# Patient Record
Sex: FEMALE | Race: WHITE | NOT HISPANIC OR LATINO | Employment: UNEMPLOYED | URBAN - NONMETROPOLITAN AREA
[De-identification: names, ages, dates, MRNs, and addresses within clinical notes are randomized per-mention and may not be internally consistent; named-entity substitution may affect disease eponyms.]

---

## 2017-08-29 ENCOUNTER — TRANSFERRED RECORDS (OUTPATIENT)
Dept: HEALTH INFORMATION MANAGEMENT | Facility: HOSPITAL | Age: 22
End: 2017-08-29

## 2018-08-22 ENCOUNTER — TRANSFERRED RECORDS (OUTPATIENT)
Dept: HEALTH INFORMATION MANAGEMENT | Facility: HOSPITAL | Age: 23
End: 2018-08-22

## 2018-09-12 ENCOUNTER — TRANSFERRED RECORDS (OUTPATIENT)
Dept: HEALTH INFORMATION MANAGEMENT | Facility: CLINIC | Age: 23
End: 2018-09-12

## 2018-12-19 ENCOUNTER — TRANSFERRED RECORDS (OUTPATIENT)
Dept: HEALTH INFORMATION MANAGEMENT | Facility: HOSPITAL | Age: 23
End: 2018-12-19

## 2019-03-04 ENCOUNTER — TRANSFERRED RECORDS (OUTPATIENT)
Dept: HEALTH INFORMATION MANAGEMENT | Facility: CLINIC | Age: 24
End: 2019-03-04

## 2019-03-07 ENCOUNTER — TRANSFERRED RECORDS (OUTPATIENT)
Dept: HEALTH INFORMATION MANAGEMENT | Facility: CLINIC | Age: 24
End: 2019-03-07

## 2019-05-02 ENCOUNTER — TRANSFERRED RECORDS (OUTPATIENT)
Dept: HEALTH INFORMATION MANAGEMENT | Facility: HOSPITAL | Age: 24
End: 2019-05-02

## 2019-07-24 ENCOUNTER — TRANSFERRED RECORDS (OUTPATIENT)
Dept: HEALTH INFORMATION MANAGEMENT | Facility: CLINIC | Age: 24
End: 2019-07-24

## 2023-04-18 PROBLEM — G43.909 MIGRAINES: Status: ACTIVE | Noted: 2019-07-09

## 2023-04-18 PROBLEM — K58.1 IRRITABLE BOWEL SYNDROME WITH CONSTIPATION: Status: ACTIVE | Noted: 2022-03-01

## 2023-04-18 PROBLEM — N80.9 ENDOMETRIOSIS: Status: ACTIVE | Noted: 2019-03-26

## 2023-04-18 PROBLEM — F32.A DEPRESSION: Status: ACTIVE | Noted: 2019-02-11

## 2023-04-18 PROBLEM — B00.2 ORAL HERPES: Status: ACTIVE | Noted: 2018-10-25

## 2023-05-03 NOTE — PROGRESS NOTES
Assessment & Plan     Encounter to establish care  Medical, surgical, family, and social histories discussed updated. Reviewed active healthcare problems. Medications reviewed. NATAN obtained for [facility]. Reviewed Care Everywhere [facility].     Intractable migraine with aura without status migrainosus  Stable, uses as needed ibuprofen.   Does have vision changes with the migraines - has aura.     Endometriosis  Menorrhagia with regular cycle  Encounter for initial prescription of contraceptive pills  Reports she saw OB GYN and was diagnosed at [facility] in [place]. Was started on progestin only with her migraines but somehow is now on estrogen containing birth control. Reviewed contraindication (increased stroke risk) with this today. Plan to change to Micronor for now. Previously tried Mirena, does not want depo shot. Is using for menses related pain and to avoid getting heavy periods and periods less often. Is s/p tubal ligation.   As she does get significantly painful menses, will have her see OB-GYN for other recommendations. She is currently using naproxen prior to menses starting.   - Ob/Gyn Referral  - norethindrone (MICRONOR) 0.35 MG tablet; Take 1 tablet (0.35 mg) by mouth daily    Irritable bowel syndrome with constipation  Long standing history - diagnosed as a child.   Has not been on medication or treated for 10 years.   Is overall stable.       Review of external notes as documented elsewhere in note       Scheduled for physical in one week.       Nirali Stephens MD  [facility] - SHABBIR Boateng is a 27 year old, presenting for the following health issues:  Establish Care    HPI   Establish care - prior care in [place]   Moved to [place], moms family lives up here  Has five kids.   Will do pap next week.   Does not like blood work - avoids needles.     Birth control - Has diagnosis of endometriosis. Gets significant pain with periods.  "Gets her period every 3 months with the birth control. Currently on estrogen birth control. Is s/p tubal ligation. Has tried Mirena - put in right put it shifted. Told her to not get another one. Will not do needles. Would see OB-GYN. Thinks she was on micronor initially when started by OB GYN in [facility].     Does get migraines with vision changes.   No history of DVT, or family history of clotting disorders. Is a non smoker. No history of elevated blood pressures or hypertension, breast cancer, or stroke.     For migraines, uses ibuprofen for relief.     Put on depression medication after child birth. Has been good since then.     IBS - constipation predominant. Diagnosed at [place] when she was a child. Not doing much recently. Hasn't really treated for ten years.          Objective    /84   Pulse 96   Temp 99.1  F (37.3  C) (Tympanic)   Resp 16   Ht 1.651 m (5' 5\")   Wt 81.2 kg (179 lb 1.6 oz)   LMP 02/27/2023 (Approximate)   SpO2 98%   BMI 29.80 kg/m    Body mass index is 29.8 kg/m .     Physical Exam   GENERAL: healthy, alert and no distress  RESP: lungs clear to auscultation - no rales, rhonchi or wheezes  CV: regular rate and rhythm, normal S1 S2, no S3 or S4, no murmur, click or rub, no peripheral edema and peripheral pulses strong  PSYCH: mentation appears normal, affect normal/bright                    Answers for HPI/ROS submitted by the patient on 5/4/2023  If you checked off any problems, how difficult have these problems made it for you to do your work, take care of things at home, or get along with other people?: Not difficult at all  PHQ9 TOTAL SCORE: 0  BALAJI 7 TOTAL SCORE: 0      "

## 2023-05-04 ASSESSMENT — ANXIETY QUESTIONNAIRES
2. NOT BEING ABLE TO STOP OR CONTROL WORRYING: NOT AT ALL
GAD7 TOTAL SCORE: 0
4. TROUBLE RELAXING: NOT AT ALL
GAD7 TOTAL SCORE: 0
7. FEELING AFRAID AS IF SOMETHING AWFUL MIGHT HAPPEN: NOT AT ALL
7. FEELING AFRAID AS IF SOMETHING AWFUL MIGHT HAPPEN: NOT AT ALL
6. BECOMING EASILY ANNOYED OR IRRITABLE: NOT AT ALL
8. IF YOU CHECKED OFF ANY PROBLEMS, HOW DIFFICULT HAVE THESE MADE IT FOR YOU TO DO YOUR WORK, TAKE CARE OF THINGS AT HOME, OR GET ALONG WITH OTHER PEOPLE?: NOT DIFFICULT AT ALL
5. BEING SO RESTLESS THAT IT IS HARD TO SIT STILL: NOT AT ALL
IF YOU CHECKED OFF ANY PROBLEMS ON THIS QUESTIONNAIRE, HOW DIFFICULT HAVE THESE PROBLEMS MADE IT FOR YOU TO DO YOUR WORK, TAKE CARE OF THINGS AT HOME, OR GET ALONG WITH OTHER PEOPLE: NOT DIFFICULT AT ALL
GAD7 TOTAL SCORE: 0
1. FEELING NERVOUS, ANXIOUS, OR ON EDGE: NOT AT ALL
3. WORRYING TOO MUCH ABOUT DIFFERENT THINGS: NOT AT ALL

## 2023-05-04 ASSESSMENT — PATIENT HEALTH QUESTIONNAIRE - PHQ9
10. IF YOU CHECKED OFF ANY PROBLEMS, HOW DIFFICULT HAVE THESE PROBLEMS MADE IT FOR YOU TO DO YOUR WORK, TAKE CARE OF THINGS AT HOME, OR GET ALONG WITH OTHER PEOPLE: NOT DIFFICULT AT ALL
SUM OF ALL RESPONSES TO PHQ QUESTIONS 1-9: 0
SUM OF ALL RESPONSES TO PHQ QUESTIONS 1-9: 0

## 2023-05-05 ENCOUNTER — OFFICE VISIT (OUTPATIENT)
Dept: FAMILY MEDICINE | Facility: OTHER | Age: 28
End: 2023-05-05
Attending: STUDENT IN AN ORGANIZED HEALTH CARE EDUCATION/TRAINING PROGRAM
Payer: COMMERCIAL

## 2023-05-05 VITALS
RESPIRATION RATE: 16 BRPM | OXYGEN SATURATION: 98 % | TEMPERATURE: 99.1 F | WEIGHT: 179.1 LBS | HEIGHT: 65 IN | SYSTOLIC BLOOD PRESSURE: 120 MMHG | DIASTOLIC BLOOD PRESSURE: 84 MMHG | BODY MASS INDEX: 29.84 KG/M2 | HEART RATE: 96 BPM

## 2023-05-05 DIAGNOSIS — Z76.89 ENCOUNTER TO ESTABLISH CARE: Primary | ICD-10-CM

## 2023-05-05 DIAGNOSIS — N92.0 MENORRHAGIA WITH REGULAR CYCLE: ICD-10-CM

## 2023-05-05 DIAGNOSIS — N80.9 ENDOMETRIOSIS: Chronic | ICD-10-CM

## 2023-05-05 DIAGNOSIS — Z30.011 ENCOUNTER FOR INITIAL PRESCRIPTION OF CONTRACEPTIVE PILLS: ICD-10-CM

## 2023-05-05 DIAGNOSIS — K58.1 IRRITABLE BOWEL SYNDROME WITH CONSTIPATION: Chronic | ICD-10-CM

## 2023-05-05 DIAGNOSIS — G43.119 INTRACTABLE MIGRAINE WITH AURA WITHOUT STATUS MIGRAINOSUS: ICD-10-CM

## 2023-05-05 PROBLEM — G43.019 INTRACTABLE MIGRAINE WITHOUT AURA AND WITHOUT STATUS MIGRAINOSUS: Status: ACTIVE | Noted: 2019-07-09

## 2023-05-05 PROBLEM — F32.A DEPRESSION: Chronic | Status: ACTIVE | Noted: 2019-02-11

## 2023-05-05 PROBLEM — G43.019 INTRACTABLE MIGRAINE WITHOUT AURA AND WITHOUT STATUS MIGRAINOSUS: Chronic | Status: ACTIVE | Noted: 2019-07-09

## 2023-05-05 PROCEDURE — G0463 HOSPITAL OUTPT CLINIC VISIT: HCPCS

## 2023-05-05 PROCEDURE — 99204 OFFICE O/P NEW MOD 45 MIN: CPT | Performed by: STUDENT IN AN ORGANIZED HEALTH CARE EDUCATION/TRAINING PROGRAM

## 2023-05-05 RX ORDER — NORETHINDRONE ACETATE AND ETHINYL ESTRADIOL 1.5-30(21)
1 KIT ORAL
Status: CANCELLED | OUTPATIENT
Start: 2023-05-05

## 2023-05-05 RX ORDER — ACETAMINOPHEN AND CODEINE PHOSPHATE 120; 12 MG/5ML; MG/5ML
0.35 SOLUTION ORAL DAILY
Qty: 84 TABLET | Refills: 0 | Status: SHIPPED | OUTPATIENT
Start: 2023-05-05 | End: 2023-07-17

## 2023-05-05 ASSESSMENT — PAIN SCALES - GENERAL: PAINLEVEL: NO PAIN (0)

## 2023-05-07 ASSESSMENT — ENCOUNTER SYMPTOMS
ARTHRALGIAS: 0
PALPITATIONS: 0
DIARRHEA: 0
HEMATURIA: 0
HEARTBURN: 1
WEAKNESS: 0
MYALGIAS: 0
FREQUENCY: 0
SORE THROAT: 0
NERVOUS/ANXIOUS: 0
DIZZINESS: 0
DYSURIA: 0
PARESTHESIAS: 0
BREAST MASS: 0
JOINT SWELLING: 0
CHILLS: 0
NAUSEA: 0
FEVER: 0
CONSTIPATION: 1
HEMATOCHEZIA: 0
HEADACHES: 0
EYE PAIN: 0
COUGH: 0
SHORTNESS OF BREATH: 0
ABDOMINAL PAIN: 0

## 2023-05-09 NOTE — PATIENT INSTRUCTIONS
Call to schedule eye exam  Try [facility] dental services [place]- [phone]  Try magnesium gummy for constipation  Make sure you're hydrating   Can try famotidine or pepcid as needed for heartburn   TMJ - need dental appointment; consider mouth guard; physical therapy will call; try topical voltaren gel; exercises on youtube.     Preventive Health Recommendations  Female Ages 26 - 39  Yearly exam:   See your health care provider every year in order to  Review health changes.   Discuss preventive care.    Review your medicines if you your doctor has prescribed any.    Until age 30: Get a Pap test every three years (more often if you have had an abnormal result).    After age 30: Talk to your doctor about whether you should have a Pap test every 3 years or have a Pap test with HPV screening every 5 years.   You do not need a Pap test if your uterus was removed (hysterectomy) and you have not had cancer.  You should be tested each year for STDs (sexually transmitted diseases), if you're at risk.   Talk to your provider about how often to have your cholesterol checked.  If you are at risk for diabetes, you should have a diabetes test (fasting glucose).  Shots: Get a flu shot each year. Get a tetanus shot every 10 years.   Nutrition:   Eat at least 5 servings of fruits and vegetables each day.  Eat whole-grain bread, whole-wheat pasta and brown rice instead of white grains and rice.  Get adequate Calcium and Vitamin D.     Lifestyle  Exercise at least 150 minutes a week (30 minutes a day, 5 days of the week). This will help you control your weight and prevent disease.  Limit alcohol to one drink per day.  No smoking.   Wear sunscreen to prevent skin cancer.  See your dentist every six months for an exam and cleaning.

## 2023-05-12 ENCOUNTER — OFFICE VISIT (OUTPATIENT)
Dept: FAMILY MEDICINE | Facility: OTHER | Age: 28
End: 2023-05-12
Attending: STUDENT IN AN ORGANIZED HEALTH CARE EDUCATION/TRAINING PROGRAM
Payer: COMMERCIAL

## 2023-05-12 VITALS
TEMPERATURE: 98.5 F | HEART RATE: 88 BPM | BODY MASS INDEX: 29.42 KG/M2 | WEIGHT: 176.6 LBS | DIASTOLIC BLOOD PRESSURE: 68 MMHG | OXYGEN SATURATION: 98 % | SYSTOLIC BLOOD PRESSURE: 101 MMHG | HEIGHT: 65 IN

## 2023-05-12 DIAGNOSIS — Z00.00 ROUTINE GENERAL MEDICAL EXAMINATION AT A HEALTH CARE FACILITY: Primary | ICD-10-CM

## 2023-05-12 DIAGNOSIS — Z12.4 PAP SMEAR FOR CERVICAL CANCER SCREENING: ICD-10-CM

## 2023-05-12 DIAGNOSIS — K59.00 CONSTIPATION, UNSPECIFIED CONSTIPATION TYPE: ICD-10-CM

## 2023-05-12 DIAGNOSIS — M26.609 TMJ (TEMPOROMANDIBULAR JOINT SYNDROME): ICD-10-CM

## 2023-05-12 PROCEDURE — G0463 HOSPITAL OUTPT CLINIC VISIT: HCPCS

## 2023-05-12 PROCEDURE — 99395 PREV VISIT EST AGE 18-39: CPT | Performed by: STUDENT IN AN ORGANIZED HEALTH CARE EDUCATION/TRAINING PROGRAM

## 2023-05-12 PROCEDURE — G0123 SCREEN CERV/VAG THIN LAYER: HCPCS | Mod: ZL | Performed by: STUDENT IN AN ORGANIZED HEALTH CARE EDUCATION/TRAINING PROGRAM

## 2023-05-12 ASSESSMENT — ENCOUNTER SYMPTOMS
HEARTBURN: 1
DYSURIA: 0
SORE THROAT: 0
PALPITATIONS: 0
FEVER: 0
HEADACHES: 0
FREQUENCY: 0
MYALGIAS: 0
HEMATURIA: 0
JOINT SWELLING: 0
CHILLS: 0
COUGH: 0
ARTHRALGIAS: 0
NAUSEA: 0
ABDOMINAL PAIN: 0
EYE PAIN: 0
BREAST MASS: 0
WEAKNESS: 0
HEMATOCHEZIA: 0
CONSTIPATION: 1
NERVOUS/ANXIOUS: 0
SHORTNESS OF BREATH: 0
DIZZINESS: 0
DIARRHEA: 0
PARESTHESIAS: 0

## 2023-05-12 ASSESSMENT — PATIENT HEALTH QUESTIONNAIRE - PHQ9
SUM OF ALL RESPONSES TO PHQ QUESTIONS 1-9: 0
10. IF YOU CHECKED OFF ANY PROBLEMS, HOW DIFFICULT HAVE THESE PROBLEMS MADE IT FOR YOU TO DO YOUR WORK, TAKE CARE OF THINGS AT HOME, OR GET ALONG WITH OTHER PEOPLE: NOT DIFFICULT AT ALL
SUM OF ALL RESPONSES TO PHQ QUESTIONS 1-9: 0

## 2023-05-12 ASSESSMENT — PAIN SCALES - GENERAL: PAINLEVEL: NO PAIN (0)

## 2023-05-12 NOTE — PROGRESS NOTES
SUBJECTIVE:   CC: Tasneem is an 27 year old who presents for preventive health visit.   Patient has been advised of split billing requirements and indicates understanding: Yes  Healthy Habits:     Getting at least 3 servings of Calcium per day:  Yes    Bi-annual eye exam:  NO    Dental care twice a year:  NO    Sleep apnea or symptoms of sleep apnea:  None    Diet:  Regular (no restrictions)    Frequency of exercise:  4-5 days/week    Duration of exercise:  Other    Taking medications regularly:  Yes    Medication side effects:  Other    PHQ-2 Total Score: 0    Additional concerns today:  No    Has been told she needs glasses in the past. Last eye exam 2-3 years ago.     Constipation - can go 2 weeks w/o BM. Has been taking plexus. Going more - once/day, every other day     Heartburn - comes/goes. Occasional. Not daily. tums doesn't help.     TMJ - has noticed pain in left jaw with clicking and occasional pain with eating for >1 year. Due for dental exam. No headaches. No tooth infections. Hasn't tried any treatment.     Answers for HPI/ROS submitted by the patient on 5/12/2023  If you checked off any problems, how difficult have these problems made it for you to do your work, take care of things at home, or get along with other people?: Not difficult at all  PHQ9 TOTAL SCORE: 0      Today's PHQ-2 Score:       5/7/2023    10:53 AM   PHQ-2 ( 1999 [employer])   Q1: Little interest or pleasure in doing things 0   Q2: Feeling down, depressed or hopeless 0   PHQ-2 Score 0   Q1: Little interest or pleasure in doing things Not at all   Q2: Feeling down, depressed or hopeless Not at all   PHQ-2 Score 0       Have you ever done Advance Care Planning? (For example, a Health Directive, POLST, or a discussion with a medical provider or your loved ones about your wishes): No, advance care planning information given to patient to review.  Patient declined advance care planning discussion at this time.    Social History     Tobacco  Use     Smoking status: Never     Smokeless tobacco: Never   Vaping Use     Vaping status: Never Used   Substance Use Topics     Alcohol use: Yes     Comment: rare           2023    10:53 AM   Alcohol Use   Prescreen: >3 drinks/day or >7 drinks/week? No     Reviewed orders with patient.  Reviewed health maintenance and updated orders accordingly - Yes    Breast Cancer Screenin/7/2023    10:55 AM   Breast CA Risk Assessment (FHS-7)   Do you have a family history of breast, colon, or ovarian cancer? No / Unknown         Patient under 40 years of age: Routine Mammogram Screening not recommended.   Pertinent mammograms are reviewed under the imaging tab.    History of abnormal Pap smear: NO - age 21-29 PAP every 3 years    recommended- unknown if abnormal was told there was something they wanted to watch when she had last one about 11 years ago but doesnt remember what it was         Reviewed and updated as needed this visit by clinical staff   Tobacco  Allergies  Meds  Problems  Med Hx  Surg Hx  Fam Hx          Reviewed and updated as needed this visit by Provider   Tobacco  Allergies  Meds  Problems  Med Hx  Surg Hx  Fam Hx           Review of Systems   Constitutional: Negative for chills and fever.   HENT: Negative for congestion, ear pain, hearing loss and sore throat.    Eyes: Negative for pain and visual disturbance.   Respiratory: Negative for cough and shortness of breath.    Cardiovascular: Negative for chest pain, palpitations and peripheral edema.   Gastrointestinal: Positive for constipation and heartburn. Negative for abdominal pain, diarrhea, hematochezia and nausea.   Breasts:  Negative for tenderness, breast mass and discharge.   Genitourinary: Negative for dysuria, frequency, genital sores, hematuria, pelvic pain, urgency, vaginal bleeding and vaginal discharge.   Musculoskeletal: Negative for arthralgias, joint swelling and myalgias.   Skin: Negative for rash.  "  Neurological: Negative for dizziness, weakness, headaches and paresthesias.   Psychiatric/Behavioral: Negative for mood changes. The patient is not nervous/anxious.         OBJECTIVE:   /68 (BP Location: Left arm, Patient Position: Sitting, Cuff Size: Adult Large)   Pulse 88   Temp 98.5  F (36.9  C) (Tympanic)   Ht 1.651 m (5' 5\")   Wt 80.1 kg (176 lb 9.6 oz)   LMP 02/27/2023 (Approximate)   SpO2 98%   BMI 29.39 kg/m       Physical Exam  Constitutional:       General: She is not in acute distress.     Appearance: Normal appearance. She is well-developed. She is not ill-appearing.   HENT:      Head: Normocephalic and atraumatic.      Comments: Tender of left TMJ, clicking appreciated bilaterally     Right Ear: Tympanic membrane and external ear normal.      Left Ear: Tympanic membrane and external ear normal.      Nose: Nose normal.      Mouth/Throat:      Mouth: Mucous membranes are moist.      Pharynx: No oropharyngeal exudate.   Eyes:      Extraocular Movements: Extraocular movements intact.      Conjunctiva/sclera: Conjunctivae normal.   Neck:      Thyroid: No thyroid mass, thyromegaly or thyroid tenderness.   Cardiovascular:      Rate and Rhythm: Normal rate and regular rhythm.      Pulses: Normal pulses.      Heart sounds: Normal heart sounds, S1 normal and S2 normal. No murmur heard.  Pulmonary:      Effort: Pulmonary effort is normal. No respiratory distress.      Breath sounds: Normal breath sounds. No wheezing, rhonchi or rales.   Chest:   Breasts:     Right: No inverted nipple or mass.      Left: No inverted nipple or mass.   Abdominal:      General: Bowel sounds are normal. There is no abdominal bruit.      Palpations: Abdomen is soft. There is no mass or pulsatile mass.      Tenderness: There is no abdominal tenderness.   Genitourinary:     General: Normal vulva.      Vagina: Normal.      Cervix: Friability present. No lesion or cervical bleeding.   Musculoskeletal:         General: Normal " "range of motion.      Cervical back: Neck supple.      Right lower leg: No edema.      Left lower leg: No edema.   Lymphadenopathy:      Cervical: No cervical adenopathy.   Skin:     General: Skin is warm and dry.      Capillary Refill: Capillary refill takes less than 2 seconds.      Findings: No rash.   Neurological:      Mental Status: She is alert and oriented to person, place, and time.      Gait: Gait is intact.   Psychiatric:         Attention and Perception: Attention normal.         Mood and Affect: Mood normal.         Speech: Speech normal.         Behavior: Behavior normal.         Thought Content: Thought content normal.         Cognition and Memory: Cognition normal.         Judgment: Judgment normal.         ASSESSMENT/PLAN:   Routine general medical examination at a health care facility  Pap updated today.   No need for early mammogram or colon cancer screening based on family history.   Declined labs today.     Pap smear for cervical cancer screening  Thinks prior was normal but she is not certain.   - A pap thin layer screen reflex to HPV if ASCUS - recommend age 25 - 29    TMJ (temporomandibular joint syndrome)  Recommend dental evaluation and mouthguard.  Discussed option for topical Voltaren gel to help with discomfort and to begin doing exercises.  Physical therapy also ordered.  Follow-up to reassess in 6 weeks.  - diclofenac (VOLTAREN) 1 % topical gel; Apply 2 g topically 3 times daily  - Physical Therapy Referral; Future    Constipation  Long history of constipation.   Currently managed with supplement.   Discussed option to add magnesium Gummies  Follow up if worsening         COUNSELING:  Reviewed preventive health counseling, as reflected in patient instructions       Regular exercise       Healthy diet/nutrition       Vision screening      BMI:   Estimated body mass index is 29.39 kg/m  as calculated from the following:    Height as of this encounter: 1.651 m (5' 5\").    Weight as of " this encounter: 80.1 kg (176 lb 9.6 oz).   Weight management plan: Discussed healthy diet and exercise guidelines      She reports that she has never smoked. She has never used smokeless tobacco.      Nirali Stephens MD  [facility] [place]

## 2023-05-16 ENCOUNTER — OFFICE VISIT (OUTPATIENT)
Dept: OBGYN | Facility: OTHER | Age: 28
End: 2023-05-16
Attending: OBSTETRICS & GYNECOLOGY
Payer: COMMERCIAL

## 2023-05-16 VITALS
SYSTOLIC BLOOD PRESSURE: 100 MMHG | WEIGHT: 173.3 LBS | OXYGEN SATURATION: 98 % | BODY MASS INDEX: 28.84 KG/M2 | DIASTOLIC BLOOD PRESSURE: 70 MMHG | HEART RATE: 86 BPM

## 2023-05-16 DIAGNOSIS — N80.9 ENDOMETRIOSIS: ICD-10-CM

## 2023-05-16 DIAGNOSIS — N94.19 DYSPAREUNIA DUE TO MEDICAL CONDITION IN FEMALE: ICD-10-CM

## 2023-05-16 DIAGNOSIS — N94.6 DYSMENORRHEA: ICD-10-CM

## 2023-05-16 DIAGNOSIS — N92.0 MENORRHAGIA WITH REGULAR CYCLE: Primary | ICD-10-CM

## 2023-05-16 PROCEDURE — 99203 OFFICE O/P NEW LOW 30 MIN: CPT | Performed by: OBSTETRICS & GYNECOLOGY

## 2023-05-16 PROCEDURE — G0463 HOSPITAL OUTPT CLINIC VISIT: HCPCS

## 2023-05-16 PROCEDURE — G0463 HOSPITAL OUTPT CLINIC VISIT: HCPCS | Mod: 25

## 2023-05-16 ASSESSMENT — PAIN SCALES - GENERAL: PAINLEVEL: NO PAIN (0)

## 2023-05-16 NOTE — PROGRESS NOTES
CC:  Consult from Dr. Stephens for menorrhagia/dysmenorrhea/endometriosis  HPI:  Tasneem Harrington is a 27 year old female P5 (vag).  She has a h/o heavy menses and was put on continuous/every three month OCP regimen. Menses on that were Regular lasting 7 days with 4 of heavy flow.  Associated severe dysmenorrhea starting 1 day prior to menses.  She has a h/o migraines with auro and was advised against estrogen containing OCP's so was just started on Norethindrone/Naparoxen.  She was diagnosed with endometriosis in 2019 at time of laparascopic salpingectomy.  Denies IMB or pelvic pain at other points in her cycle.  + dyspareunia/pain with BM.  Her menstrual flow is limiting her clothing choices and interferes with lifestyle/activites.     Outside records/Op reports  reviewed.    Clots: Yes  Intermenstrual bleeding: No  Post-coital bleeding: No  Previous work-up:Yes  Contraception: Salpingectomy  Abnormal Pap: No      Past GYN history: Neg STD history       Last PAP smear:  Previously nml, Pap pending.     Patients records are available and reviewed at today's visit.    Past Medical History:   Diagnosis Date     Endometriosis      Postpartum depression 02/11/2019       Past Surgical History:   Procedure Laterality Date     TONSILLECTOMY       TUBAL LIGATION         Family History   Problem Relation Age of Onset     Hypertension Father      Hyperlipidemia Father      Crohn's Disease Brother      Heart Disease Maternal Grandmother         leaky valve     Aneurysm Maternal Grandmother      Crohn's Disease Cousin      Cancer Paternal Uncle         throat cancer     Hyperlipidemia Son        Current Outpatient Medications   Medication Sig Dispense Refill     diclofenac (VOLTAREN) 1 % topical gel Apply 2 g topically 3 times daily 100 g 1     naproxen (NAPROSYN) 500 MG tablet Take 500 mg by mouth       norethindrone (MICRONOR) 0.35 MG tablet Take 1 tablet (0.35 mg) by mouth daily 84 tablet 0       Allergies: Latex, Morphine,  Sulfamethoxazole-trimethoprim, and Codeine    ROS:  CONSTITUTIONAL: NEGATIVE for fever, chills, change in weight    GI: NEGATIVE for nausea, abdominal pain, heartburn, or change in bowel habits  : NEGATIVE for frequency, dysuria, hematuria, vaginal discharge    EXAM:  Blood pressure 100/70, pulse 86, weight 78.6 kg (173 lb 4.8 oz), last menstrual period 02/27/2023, SpO2 98 %.   BMI= Body mass index is 28.84 kg/m .  General - pleasant female in no acute distress.  Rectovaginal - deferred, not repeated  Musculoskeletal - no gross deformities or edema  Neurological - normal mental status.      ASSESSMENT/PLAN:  Menorrhagia/dysmenorrhea/dyspareunia/endometriosis.    Discussed medical options(progesterone, GNRH agonists), surgical options including laparoscopy, hysterectomy.  R/B/SE of each.   At this point she would like to se how the progesterone (norethindrone) that she just started works prior to proceeding to any more aggressive treatments.  She is aware of SE's of irregular bleeding.    Pt has my card and phone number to call as needed if problems in the interim or for f/up if problems. 35  minutes were spent on the day of the encounter, outside of a procedure,  doing face-to-face counseling, review or records, charting,  and coordination of care

## 2023-05-18 LAB
BKR LAB AP GYN ADEQUACY: NORMAL
BKR LAB AP GYN INTERPRETATION: NORMAL
BKR LAB AP HPV REFLEX: NORMAL
BKR LAB AP PREVIOUS ABNORMAL: NORMAL
PATH REPORT.COMMENTS IMP SPEC: NORMAL
PATH REPORT.COMMENTS IMP SPEC: NORMAL
PATH REPORT.RELEVANT HX SPEC: NORMAL

## 2023-05-21 ENCOUNTER — HEALTH MAINTENANCE LETTER (OUTPATIENT)
Age: 28
End: 2023-05-21

## 2023-06-05 PROBLEM — G43.909 MIGRAINES: Status: ACTIVE | Noted: 2019-07-09

## 2023-06-16 PROBLEM — F32.A DEPRESSION: Chronic | Status: ACTIVE | Noted: 2023-06-16

## 2023-06-16 PROBLEM — F32.A DEPRESSION: Status: ACTIVE | Noted: 2023-06-16

## 2023-07-14 NOTE — PROGRESS NOTES
Assessment & Plan     TMJ (temporomandibular joint syndrome)  Doing well - symptoms stable.   Can continue with voltaren gel.   Encouraged her to see a dentist for a mouth guard.     Intractable migraine with aura without status migrainosus  Refilled. Rare use - 2x/month.   - ibuprofen (ADVIL/MOTRIN) 800 MG tablet; Take 1 tablet (800 mg) by mouth every 8 hours as needed for moderate pain or inflammatory pain (migraines) Do not take with naproxen.    Menorrhagia with regular cycle  Refilled. Naproxen only used with menses. micronor refilled.   - naproxen (NAPROSYN) 500 MG tablet; Take 1 tablet (500 mg) by mouth 2 times daily as needed for moderate pain (menses related pain)  - norethindrone (MICRONOR) 0.35 MG tablet; Take 1 tablet (0.35 mg) by mouth daily    Encounter for surveillance of contraceptive pills  Refilled.   - norethindrone (MICRONOR) 0.35 MG tablet; Take 1 tablet (0.35 mg) by mouth daily    Return in about 43 weeks (around 5/13/2024) for Physical.    Nirali Stephens MD  [facility] - SHABBIR Boateng is a 28 year old, presenting for the following health issues:  TMJ      HPI   Concern - TMJ  Onset: over a year    Description:   jaw pain in both sides with opening mouth     Intensity: moderate    Progression of Symptoms:  intermittent    Accompanying Signs & Symptoms:  Headaches, stiff neck     Previous history of similar problem:   No- ongoing for years    Precipitating factors:   Worsened by: eating, talking    Alleviating factors:  Improved by: nothing  Therapies Tried and outcome: voltaren    voltaren has been helpful - doesn't seem to flare as often.   Rare, weeks in between flares.   Does voltaren once/day.     Needing refill of ibuprofen for migraines. Use is 2/30 days usually.   Uses naproxen for menses. Only takes on days of periods.   Considering partial hysterectomy. Would like to wait until winter.         Objective    /68   Pulse 92   Resp 16   Wt 79.9  kg (176 lb 4 oz)   SpO2 98%   BMI 29.33 kg/m    Body mass index is 29.33 kg/m .     Physical Exam  Constitutional:       General: She is not in acute distress.     Appearance: Normal appearance. She is not ill-appearing.   Neurological:      Mental Status: She is alert.   Psychiatric:         Mood and Affect: Mood normal.         Behavior: Behavior normal.         Thought Content: Thought content normal.         Judgment: Judgment normal.          Answers for HPI/ROS submitted by the patient on 7/16/2023  If you checked off any problems, how difficult have these problems made it for you to do your work, take care of things at home, or get along with other people?: Not difficult at all  PHQ9 TOTAL SCORE: 0

## 2023-07-16 ASSESSMENT — PATIENT HEALTH QUESTIONNAIRE - PHQ9
SUM OF ALL RESPONSES TO PHQ QUESTIONS 1-9: 0
SUM OF ALL RESPONSES TO PHQ QUESTIONS 1-9: 0
10. IF YOU CHECKED OFF ANY PROBLEMS, HOW DIFFICULT HAVE THESE PROBLEMS MADE IT FOR YOU TO DO YOUR WORK, TAKE CARE OF THINGS AT HOME, OR GET ALONG WITH OTHER PEOPLE: NOT DIFFICULT AT ALL

## 2023-07-17 ENCOUNTER — OFFICE VISIT (OUTPATIENT)
Dept: FAMILY MEDICINE | Facility: OTHER | Age: 28
End: 2023-07-17
Attending: STUDENT IN AN ORGANIZED HEALTH CARE EDUCATION/TRAINING PROGRAM
Payer: COMMERCIAL

## 2023-07-17 VITALS
HEART RATE: 92 BPM | SYSTOLIC BLOOD PRESSURE: 106 MMHG | OXYGEN SATURATION: 98 % | BODY MASS INDEX: 29.33 KG/M2 | DIASTOLIC BLOOD PRESSURE: 68 MMHG | RESPIRATION RATE: 16 BRPM | WEIGHT: 176.25 LBS

## 2023-07-17 DIAGNOSIS — N92.0 MENORRHAGIA WITH REGULAR CYCLE: ICD-10-CM

## 2023-07-17 DIAGNOSIS — Z30.41 ENCOUNTER FOR SURVEILLANCE OF CONTRACEPTIVE PILLS: ICD-10-CM

## 2023-07-17 DIAGNOSIS — M26.609 TMJ (TEMPOROMANDIBULAR JOINT SYNDROME): Primary | ICD-10-CM

## 2023-07-17 DIAGNOSIS — G43.119 INTRACTABLE MIGRAINE WITH AURA WITHOUT STATUS MIGRAINOSUS: ICD-10-CM

## 2023-07-17 PROCEDURE — G0463 HOSPITAL OUTPT CLINIC VISIT: HCPCS

## 2023-07-17 PROCEDURE — 99213 OFFICE O/P EST LOW 20 MIN: CPT | Performed by: STUDENT IN AN ORGANIZED HEALTH CARE EDUCATION/TRAINING PROGRAM

## 2023-07-17 RX ORDER — ACETAMINOPHEN AND CODEINE PHOSPHATE 120; 12 MG/5ML; MG/5ML
0.35 SOLUTION ORAL DAILY
Qty: 84 TABLET | Refills: 3 | Status: SHIPPED | OUTPATIENT
Start: 2023-07-17 | End: 2023-09-05

## 2023-07-17 RX ORDER — NAPROXEN 500 MG/1
500 TABLET ORAL 2 TIMES DAILY PRN
Qty: 30 TABLET | Refills: 3 | Status: SHIPPED | OUTPATIENT
Start: 2023-07-17 | End: 2023-10-19

## 2023-07-17 RX ORDER — IBUPROFEN 800 MG/1
800 TABLET, FILM COATED ORAL EVERY 8 HOURS PRN
Qty: 90 TABLET | Refills: 1 | Status: SHIPPED | OUTPATIENT
Start: 2023-07-17

## 2023-08-01 ENCOUNTER — PREP FOR PROCEDURE (OUTPATIENT)
Dept: OBGYN | Facility: OTHER | Age: 28
End: 2023-08-01

## 2023-08-01 DIAGNOSIS — N94.6 DYSMENORRHEA: ICD-10-CM

## 2023-08-01 DIAGNOSIS — N80.9 ENDOMETRIOSIS: Primary | ICD-10-CM

## 2023-09-05 ENCOUNTER — OFFICE VISIT (OUTPATIENT)
Dept: FAMILY MEDICINE | Facility: OTHER | Age: 28
End: 2023-09-05
Attending: STUDENT IN AN ORGANIZED HEALTH CARE EDUCATION/TRAINING PROGRAM
Payer: COMMERCIAL

## 2023-09-05 VITALS
DIASTOLIC BLOOD PRESSURE: 72 MMHG | WEIGHT: 173.6 LBS | BODY MASS INDEX: 28.92 KG/M2 | OXYGEN SATURATION: 99 % | SYSTOLIC BLOOD PRESSURE: 110 MMHG | HEIGHT: 65 IN | HEART RATE: 97 BPM | TEMPERATURE: 98 F

## 2023-09-05 DIAGNOSIS — Z01.818 PREOP GENERAL PHYSICAL EXAM: Primary | ICD-10-CM

## 2023-09-05 DIAGNOSIS — G43.119 INTRACTABLE MIGRAINE WITH AURA WITHOUT STATUS MIGRAINOSUS: Chronic | ICD-10-CM

## 2023-09-05 DIAGNOSIS — K58.1 IRRITABLE BOWEL SYNDROME WITH CONSTIPATION: Chronic | ICD-10-CM

## 2023-09-05 DIAGNOSIS — N92.0 MENORRHAGIA WITH REGULAR CYCLE: ICD-10-CM

## 2023-09-05 DIAGNOSIS — N80.9 ENDOMETRIOSIS: Chronic | ICD-10-CM

## 2023-09-05 LAB
ANION GAP SERPL CALCULATED.3IONS-SCNC: 12 MMOL/L (ref 7–15)
BASOPHILS # BLD AUTO: 0 10E3/UL (ref 0–0.2)
BASOPHILS NFR BLD AUTO: 1 %
BUN SERPL-MCNC: 15.2 MG/DL (ref 6–20)
CALCIUM SERPL-MCNC: 9.9 MG/DL (ref 8.6–10)
CHLORIDE SERPL-SCNC: 104 MMOL/L (ref 98–107)
CREAT SERPL-MCNC: 0.69 MG/DL (ref 0.51–0.95)
DEPRECATED HCO3 PLAS-SCNC: 24 MMOL/L (ref 22–29)
EOSINOPHIL # BLD AUTO: 0.1 10E3/UL (ref 0–0.7)
EOSINOPHIL NFR BLD AUTO: 1 %
ERYTHROCYTE [DISTWIDTH] IN BLOOD BY AUTOMATED COUNT: 13.7 % (ref 10–15)
GFR SERPL CREATININE-BSD FRML MDRD: >90 ML/MIN/1.73M2
GLUCOSE SERPL-MCNC: 93 MG/DL (ref 70–99)
HCT VFR BLD AUTO: 39.6 % (ref 35–47)
HGB BLD-MCNC: 13 G/DL (ref 11.7–15.7)
IMM GRANULOCYTES # BLD: 0 10E3/UL
IMM GRANULOCYTES NFR BLD: 0 %
LYMPHOCYTES # BLD AUTO: 2.7 10E3/UL (ref 0.8–5.3)
LYMPHOCYTES NFR BLD AUTO: 34 %
MCH RBC QN AUTO: 27.8 PG (ref 26.5–33)
MCHC RBC AUTO-ENTMCNC: 32.8 G/DL (ref 31.5–36.5)
MCV RBC AUTO: 85 FL (ref 78–100)
MONOCYTES # BLD AUTO: 0.6 10E3/UL (ref 0–1.3)
MONOCYTES NFR BLD AUTO: 8 %
NEUTROPHILS # BLD AUTO: 4.5 10E3/UL (ref 1.6–8.3)
NEUTROPHILS NFR BLD AUTO: 56 %
NRBC # BLD AUTO: 0 10E3/UL
NRBC BLD AUTO-RTO: 0 /100
PLATELET # BLD AUTO: 276 10E3/UL (ref 150–450)
POTASSIUM SERPL-SCNC: 3.8 MMOL/L (ref 3.4–5.3)
RBC # BLD AUTO: 4.68 10E6/UL (ref 3.8–5.2)
SODIUM SERPL-SCNC: 140 MMOL/L (ref 136–145)
WBC # BLD AUTO: 7.9 10E3/UL (ref 4–11)

## 2023-09-05 PROCEDURE — 36415 COLL VENOUS BLD VENIPUNCTURE: CPT | Mod: ZL | Performed by: STUDENT IN AN ORGANIZED HEALTH CARE EDUCATION/TRAINING PROGRAM

## 2023-09-05 PROCEDURE — G0463 HOSPITAL OUTPT CLINIC VISIT: HCPCS

## 2023-09-05 PROCEDURE — 85025 COMPLETE CBC W/AUTO DIFF WBC: CPT | Mod: ZL | Performed by: STUDENT IN AN ORGANIZED HEALTH CARE EDUCATION/TRAINING PROGRAM

## 2023-09-05 PROCEDURE — 93010 ELECTROCARDIOGRAM REPORT: CPT | Mod: 77 | Performed by: INTERNAL MEDICINE

## 2023-09-05 PROCEDURE — 80048 BASIC METABOLIC PNL TOTAL CA: CPT | Mod: ZL | Performed by: STUDENT IN AN ORGANIZED HEALTH CARE EDUCATION/TRAINING PROGRAM

## 2023-09-05 PROCEDURE — 93005 ELECTROCARDIOGRAM TRACING: CPT | Performed by: STUDENT IN AN ORGANIZED HEALTH CARE EDUCATION/TRAINING PROGRAM

## 2023-09-05 PROCEDURE — 99214 OFFICE O/P EST MOD 30 MIN: CPT | Performed by: STUDENT IN AN ORGANIZED HEALTH CARE EDUCATION/TRAINING PROGRAM

## 2023-09-05 ASSESSMENT — PAIN SCALES - GENERAL: PAINLEVEL: NO PAIN (0)

## 2023-09-05 NOTE — PROGRESS NOTES
[facility] [place]  [address]  [place] [address]  Phone: [phone]  Primary Provider: Myrna Shelton  Pre-op Performing Provider: MYRNA SHELTON      PREOPERATIVE EVALUATION:  Today's date: 9/5/2023    Tasneem Harrington is a 28 year old female who presents for a preoperative evaluation.      9/5/2023     2:58 PM   Additional Questions   Roomed by Apurva Mcintosh CMA   Accompanied by self       Surgical Information:  Surgery/Procedure: Partial Hysterectomy  Surgery Location: [place]  Surgeon: Jordin  Surgery Date: 09/20/2023  Time of Surgery: TBD  Where patient plans to recover: At home with family  Fax number for surgical facility: Note does not need to be faxed, will be available electronically in Epic.    Assessment & Plan     The proposed surgical procedure is considered INTERMEDIATE risk.    Preop general physical exam  Optimized for surgery.  - EKG 12-lead complete w/read - Clinics  - CBC with Platelets & Differential  - Basic metabolic panel    Endometriosis  Menorrhagia with regular cycle  Reason for surgery.     Intractable migraine with aura without status migrainosus  Stable. Not on daily medications. Will hold naproxen/ibuprofen.     Irritable bowel syndrome with constipation  Stable. Not on medications.        - No identified additional risk factors other than previously addressed    Antiplatelet or Anticoagulation Medication Instructions:   - Patient is on no antiplatelet or anticoagulation medications.    Additional Medication Instructions:  Patient is on no additional chronic medications    RECOMMENDATION:  APPROVAL GIVEN to proceed with proposed procedure, without further diagnostic evaluation.      Subjective     HPI related to upcoming procedure: patient has endometriosis and prolonged dysmenorrhea with menorrhagia. Planning partial hysterectomy.     Has been feeling well with no fevers, congestion, cough, or other URI symptoms. Had a cold two weeks, recovered.   No ischemic  cardiac history. Denies any shortness of breath, chest pain, or dyspnea on exertion.   Is able to function at >4 METS.   Has  had prior surgeries with general anesthesia and did well.     Nervous about blood draw today.         8/29/2023    10:56 AM   Preop Questions   1. Have you ever had a heart attack or stroke? No   2. Have you ever had surgery on your heart or blood vessels, such as a stent placement, a coronary artery bypass, or surgery on an artery in your head, neck, heart, or legs? No   3. Do you have chest pain with activity? No   4. Do you have a history of  heart failure? No   5. Do you currently have a cold, bronchitis or symptoms of other infection? No   6. Do you have a cough, shortness of breath, or wheezing? No   7. Do you or anyone in your family have previous history of blood clots? UNKNOWN - none personally.    8. Do you or does anyone in your family have a serious bleeding problem such as prolonged bleeding following surgeries or cuts? No   9. Have you ever had problems with anemia or been told to take iron pills? No   10. Have you had any abnormal blood loss such as black, tarry or bloody stools, or abnormal vaginal bleeding? No   11. Have you ever had a blood transfusion? No   12. Are you willing to have a blood transfusion if it is medically needed before, during, or after your surgery? Yes   13. Have you or any of your relatives ever had problems with anesthesia? UNKNOWN - mom said she personally doesn't do well with anesthesia; patient had no complications   14. Do you have sleep apnea, excessive snoring or daytime drowsiness? No   15. Do you have any artifical heart valves or other implanted medical devices like a pacemaker, defibrillator, or continuous glucose monitor? No   16. Do you have artificial joints? No   17. Are you allergic to latex? YES: break out    18. Is there any chance that you may be pregnant? No       Health Care Directive:  Patient does not have a Health Care Directive  or Living Will: Discussed advance care planning with patient; however, patient declined at this time.    Preoperative Review of :   reviewed - no record of controlled substances prescribed.    Status of Chronic Conditions:  Migraines - stable. Hasn't needed medication.     IBS - intermittent. Doesn't take any drug therapy.     Review of Systems  Constitutional, neuro, ENT, endocrine, pulmonary, cardiac, gastrointestinal, genitourinary, musculoskeletal, integument and psychiatric systems are negative, except as otherwise noted.    Patient Active Problem List    Diagnosis Date Noted    Depression 06/16/2023     Priority: Medium     History of lexapro       TMJ (temporomandibular joint syndrome) 05/12/2023     Priority: Medium    Menorrhagia with regular cycle 05/05/2023     Priority: Medium    Irritable bowel syndrome with constipation 03/01/2022     Priority: Medium    Intractable migraine with aura without status migrainosus 07/09/2019     Priority: Medium    Endometriosis 03/26/2019     Priority: Medium     Laparoscopy at [facility]       Oral herpes 10/25/2018     Priority: Low     HSV 1. Oral HSV        Past Medical History:   Diagnosis Date    Endometriosis     Endometriosis     diagnosed on laparoscopy - [facility]    Migraine     Postpartum depression 02/11/2019    Tinea versicolor      Past Surgical History:   Procedure Laterality Date    LAPAROSCOPY      TONSILLECTOMY      TUBAL LIGATION  2019     Current Outpatient Medications   Medication Sig Dispense Refill    ibuprofen (ADVIL/MOTRIN) 800 MG tablet Take 1 tablet (800 mg) by mouth every 8 hours as needed for moderate pain or inflammatory pain (migraines) Do not take with naproxen. 90 tablet 1    naproxen (NAPROSYN) 500 MG tablet Take 1 tablet (500 mg) by mouth 2 times daily as needed for moderate pain (menses related pain) 30 tablet 3    diclofenac (VOLTAREN) 1 % topical gel Apply 2 g topically 3 times daily  "(Patient not taking: Reported on 9/5/2023) 100 g 1    triamcinolone (KENALOG) 0.1 % external cream  (Patient not taking: Reported on 9/5/2023)         Allergies   Allergen Reactions    Codeine Hives, Itching and Nausea    Latex Itching, Rash and Hives    Morphine Hives and Nausea and Vomiting    Sulfamethoxazole-Trimethoprim Hives, Nausea and Vomiting and Rash        Social History     Tobacco Use    Smoking status: Never    Smokeless tobacco: Never   Substance Use Topics    Alcohol use: Yes     Comment: rare     Family History   Problem Relation Age of Onset    Hypertension Father     Hyperlipidemia Father     Crohn's Disease Brother     Heart Disease Maternal Grandmother         leaky valve    Aneurysm Maternal Grandmother     Crohn's Disease Cousin     Cancer Paternal Uncle         throat cancer    Hyperlipidemia Son      History   Drug Use Unknown         Objective     /72 (BP Location: Right arm, Patient Position: Sitting, Cuff Size: Adult Regular)   Pulse 97   Temp 98  F (36.7  C) (Tympanic)   Ht 1.651 m (5' 5\")   Wt 78.7 kg (173 lb 9.6 oz)   LMP 09/01/2023 (Exact Date)   SpO2 99%   BMI 28.89 kg/m      Physical Exam    GENERAL APPEARANCE: healthy, alert and no distress     EYES: EOMI, PERRL     HENT: ear canals and TM's normal and nose and mouth without ulcers or lesions     NECK: no adenopathy, no asymmetry, masses, or scars and thyroid normal to palpation     RESP: lungs clear to auscultation - no rales, rhonchi or wheezes     CV: regular rates and rhythm, normal S1 S2, no S3 or S4 and no murmur, click or rub; no peripheral edema      ABDOMEN:  soft, nontender, no HSM or masses and bowel sounds normal     MS: extremities normal- no gross deformities noted, no evidence of inflammation in joints, FROM in all extremities.     SKIN: no suspicious lesions or rashes     NEURO: Normal strength and tone, sensory exam grossly normal, mentation intact and speech normal     PSYCH: mentation appears normal. " and affect normal/bright     LYMPHATICS: No cervical adenopathy    No results for input(s): HGB, PLT, INR, NA, POTASSIUM, CR, A1C in the last 63706 hours.     Diagnostics:  Recent Results (from the past 24 hour(s))   Basic metabolic panel    Collection Time: 09/05/23  3:28 PM   Result Value Ref Range    Sodium 140 136 - 145 mmol/L    Potassium 3.8 3.4 - 5.3 mmol/L    Chloride 104 98 - 107 mmol/L    Carbon Dioxide (CO2) 24 22 - 29 mmol/L    Anion Gap 12 7 - 15 mmol/L    Urea Nitrogen 15.2 6.0 - 20.0 mg/dL    Creatinine 0.69 0.51 - 0.95 mg/dL    Calcium 9.9 8.6 - 10.0 mg/dL    Glucose 93 70 - 99 mg/dL    GFR Estimate >90 >60 mL/min/1.73m2   CBC with platelets and differential    Collection Time: 09/05/23  3:28 PM   Result Value Ref Range    WBC Count 7.9 4.0 - 11.0 10e3/uL    RBC Count 4.68 3.80 - 5.20 10e6/uL    Hemoglobin 13.0 11.7 - 15.7 g/dL    Hematocrit 39.6 35.0 - 47.0 %    MCV 85 78 - 100 fL    MCH 27.8 26.5 - 33.0 pg    MCHC 32.8 31.5 - 36.5 g/dL    RDW 13.7 10.0 - 15.0 %    Platelet Count 276 150 - 450 10e3/uL    % Neutrophils 56 %    % Lymphocytes 34 %    % Monocytes 8 %    % Eosinophils 1 %    % Basophils 1 %    % Immature Granulocytes 0 %    NRBCs per 100 WBC 0 <1 /100    Absolute Neutrophils 4.5 1.6 - 8.3 10e3/uL    Absolute Lymphocytes 2.7 0.8 - 5.3 10e3/uL    Absolute Monocytes 0.6 0.0 - 1.3 10e3/uL    Absolute Eosinophils 0.1 0.0 - 0.7 10e3/uL    Absolute Basophils 0.0 0.0 - 0.2 10e3/uL    Absolute Immature Granulocytes 0.0 <=0.4 10e3/uL    Absolute NRBCs 0.0 10e3/uL      EKG: appears normal, NSR, normal axis, normal intervals, no acute ST/T changes c/w ischemia, no LVH by voltage criteria, there are no prior tracings available    Revised Cardiac Risk Index (RCRI):  The patient has the following serious cardiovascular risks for perioperative complications:   - No serious cardiac risks = 0 points     RCRI Interpretation: 0 points: Class I (very low risk - 0.4% complication rate)         Signed  Electronically by: Nirali Stephens MD  Copy of this evaluation report is provided to requesting physician.

## 2023-09-05 NOTE — H&P (VIEW-ONLY)
[facility] [place]  [address]  [place] [address]  Phone: [phone]  Primary Provider: Myrna Shelton  Pre-op Performing Provider: MYRNA SHELTON      PREOPERATIVE EVALUATION:  Today's date: 9/5/2023    Tasneem Harrington is a 28 year old female who presents for a preoperative evaluation.      9/5/2023     2:58 PM   Additional Questions   Roomed by Apurva Mcintosh CMA   Accompanied by self       Surgical Information:  Surgery/Procedure: Partial Hysterectomy  Surgery Location: [place]  Surgeon: Jordin  Surgery Date: 09/20/2023  Time of Surgery: TBD  Where patient plans to recover: At home with family  Fax number for surgical facility: Note does not need to be faxed, will be available electronically in Epic.    Assessment & Plan     The proposed surgical procedure is considered INTERMEDIATE risk.    Preop general physical exam  Optimized for surgery.  - EKG 12-lead complete w/read - Clinics  - CBC with Platelets & Differential  - Basic metabolic panel    Endometriosis  Menorrhagia with regular cycle  Reason for surgery.     Intractable migraine with aura without status migrainosus  Stable. Not on daily medications. Will hold naproxen/ibuprofen.     Irritable bowel syndrome with constipation  Stable. Not on medications.        - No identified additional risk factors other than previously addressed    Antiplatelet or Anticoagulation Medication Instructions:   - Patient is on no antiplatelet or anticoagulation medications.    Additional Medication Instructions:  Patient is on no additional chronic medications    RECOMMENDATION:  APPROVAL GIVEN to proceed with proposed procedure, without further diagnostic evaluation.      Subjective     HPI related to upcoming procedure: patient has endometriosis and prolonged dysmenorrhea with menorrhagia. Planning partial hysterectomy.     Has been feeling well with no fevers, congestion, cough, or other URI symptoms. Had a cold two weeks, recovered.   No ischemic  cardiac history. Denies any shortness of breath, chest pain, or dyspnea on exertion.   Is able to function at >4 METS.   Has  had prior surgeries with general anesthesia and did well.     Nervous about blood draw today.         8/29/2023    10:56 AM   Preop Questions   1. Have you ever had a heart attack or stroke? No   2. Have you ever had surgery on your heart or blood vessels, such as a stent placement, a coronary artery bypass, or surgery on an artery in your head, neck, heart, or legs? No   3. Do you have chest pain with activity? No   4. Do you have a history of  heart failure? No   5. Do you currently have a cold, bronchitis or symptoms of other infection? No   6. Do you have a cough, shortness of breath, or wheezing? No   7. Do you or anyone in your family have previous history of blood clots? UNKNOWN - none personally.    8. Do you or does anyone in your family have a serious bleeding problem such as prolonged bleeding following surgeries or cuts? No   9. Have you ever had problems with anemia or been told to take iron pills? No   10. Have you had any abnormal blood loss such as black, tarry or bloody stools, or abnormal vaginal bleeding? No   11. Have you ever had a blood transfusion? No   12. Are you willing to have a blood transfusion if it is medically needed before, during, or after your surgery? Yes   13. Have you or any of your relatives ever had problems with anesthesia? UNKNOWN - mom said she personally doesn't do well with anesthesia; patient had no complications   14. Do you have sleep apnea, excessive snoring or daytime drowsiness? No   15. Do you have any artifical heart valves or other implanted medical devices like a pacemaker, defibrillator, or continuous glucose monitor? No   16. Do you have artificial joints? No   17. Are you allergic to latex? YES: break out    18. Is there any chance that you may be pregnant? No       Health Care Directive:  Patient does not have a Health Care Directive  or Living Will: Discussed advance care planning with patient; however, patient declined at this time.    Preoperative Review of :   reviewed - no record of controlled substances prescribed.    Status of Chronic Conditions:  Migraines - stable. Hasn't needed medication.     IBS - intermittent. Doesn't take any drug therapy.     Review of Systems  Constitutional, neuro, ENT, endocrine, pulmonary, cardiac, gastrointestinal, genitourinary, musculoskeletal, integument and psychiatric systems are negative, except as otherwise noted.    Patient Active Problem List    Diagnosis Date Noted    Depression 06/16/2023     Priority: Medium     History of lexapro       TMJ (temporomandibular joint syndrome) 05/12/2023     Priority: Medium    Menorrhagia with regular cycle 05/05/2023     Priority: Medium    Irritable bowel syndrome with constipation 03/01/2022     Priority: Medium    Intractable migraine with aura without status migrainosus 07/09/2019     Priority: Medium    Endometriosis 03/26/2019     Priority: Medium     Laparoscopy at [facility]       Oral herpes 10/25/2018     Priority: Low     HSV 1. Oral HSV        Past Medical History:   Diagnosis Date    Endometriosis     Endometriosis     diagnosed on laparoscopy - [facility]    Migraine     Postpartum depression 02/11/2019    Tinea versicolor      Past Surgical History:   Procedure Laterality Date    LAPAROSCOPY      TONSILLECTOMY      TUBAL LIGATION  2019     Current Outpatient Medications   Medication Sig Dispense Refill    ibuprofen (ADVIL/MOTRIN) 800 MG tablet Take 1 tablet (800 mg) by mouth every 8 hours as needed for moderate pain or inflammatory pain (migraines) Do not take with naproxen. 90 tablet 1    naproxen (NAPROSYN) 500 MG tablet Take 1 tablet (500 mg) by mouth 2 times daily as needed for moderate pain (menses related pain) 30 tablet 3    diclofenac (VOLTAREN) 1 % topical gel Apply 2 g topically 3 times daily  "(Patient not taking: Reported on 9/5/2023) 100 g 1    triamcinolone (KENALOG) 0.1 % external cream  (Patient not taking: Reported on 9/5/2023)         Allergies   Allergen Reactions    Codeine Hives, Itching and Nausea    Latex Itching, Rash and Hives    Morphine Hives and Nausea and Vomiting    Sulfamethoxazole-Trimethoprim Hives, Nausea and Vomiting and Rash        Social History     Tobacco Use    Smoking status: Never    Smokeless tobacco: Never   Substance Use Topics    Alcohol use: Yes     Comment: rare     Family History   Problem Relation Age of Onset    Hypertension Father     Hyperlipidemia Father     Crohn's Disease Brother     Heart Disease Maternal Grandmother         leaky valve    Aneurysm Maternal Grandmother     Crohn's Disease Cousin     Cancer Paternal Uncle         throat cancer    Hyperlipidemia Son      History   Drug Use Unknown         Objective     /72 (BP Location: Right arm, Patient Position: Sitting, Cuff Size: Adult Regular)   Pulse 97   Temp 98  F (36.7  C) (Tympanic)   Ht 1.651 m (5' 5\")   Wt 78.7 kg (173 lb 9.6 oz)   LMP 09/01/2023 (Exact Date)   SpO2 99%   BMI 28.89 kg/m      Physical Exam    GENERAL APPEARANCE: healthy, alert and no distress     EYES: EOMI, PERRL     HENT: ear canals and TM's normal and nose and mouth without ulcers or lesions     NECK: no adenopathy, no asymmetry, masses, or scars and thyroid normal to palpation     RESP: lungs clear to auscultation - no rales, rhonchi or wheezes     CV: regular rates and rhythm, normal S1 S2, no S3 or S4 and no murmur, click or rub; no peripheral edema      ABDOMEN:  soft, nontender, no HSM or masses and bowel sounds normal     MS: extremities normal- no gross deformities noted, no evidence of inflammation in joints, FROM in all extremities.     SKIN: no suspicious lesions or rashes     NEURO: Normal strength and tone, sensory exam grossly normal, mentation intact and speech normal     PSYCH: mentation appears normal. " and affect normal/bright     LYMPHATICS: No cervical adenopathy    No results for input(s): HGB, PLT, INR, NA, POTASSIUM, CR, A1C in the last 70114 hours.     Diagnostics:  Recent Results (from the past 24 hour(s))   Basic metabolic panel    Collection Time: 09/05/23  3:28 PM   Result Value Ref Range    Sodium 140 136 - 145 mmol/L    Potassium 3.8 3.4 - 5.3 mmol/L    Chloride 104 98 - 107 mmol/L    Carbon Dioxide (CO2) 24 22 - 29 mmol/L    Anion Gap 12 7 - 15 mmol/L    Urea Nitrogen 15.2 6.0 - 20.0 mg/dL    Creatinine 0.69 0.51 - 0.95 mg/dL    Calcium 9.9 8.6 - 10.0 mg/dL    Glucose 93 70 - 99 mg/dL    GFR Estimate >90 >60 mL/min/1.73m2   CBC with platelets and differential    Collection Time: 09/05/23  3:28 PM   Result Value Ref Range    WBC Count 7.9 4.0 - 11.0 10e3/uL    RBC Count 4.68 3.80 - 5.20 10e6/uL    Hemoglobin 13.0 11.7 - 15.7 g/dL    Hematocrit 39.6 35.0 - 47.0 %    MCV 85 78 - 100 fL    MCH 27.8 26.5 - 33.0 pg    MCHC 32.8 31.5 - 36.5 g/dL    RDW 13.7 10.0 - 15.0 %    Platelet Count 276 150 - 450 10e3/uL    % Neutrophils 56 %    % Lymphocytes 34 %    % Monocytes 8 %    % Eosinophils 1 %    % Basophils 1 %    % Immature Granulocytes 0 %    NRBCs per 100 WBC 0 <1 /100    Absolute Neutrophils 4.5 1.6 - 8.3 10e3/uL    Absolute Lymphocytes 2.7 0.8 - 5.3 10e3/uL    Absolute Monocytes 0.6 0.0 - 1.3 10e3/uL    Absolute Eosinophils 0.1 0.0 - 0.7 10e3/uL    Absolute Basophils 0.0 0.0 - 0.2 10e3/uL    Absolute Immature Granulocytes 0.0 <=0.4 10e3/uL    Absolute NRBCs 0.0 10e3/uL      EKG: appears normal, NSR, normal axis, normal intervals, no acute ST/T changes c/w ischemia, no LVH by voltage criteria, there are no prior tracings available    Revised Cardiac Risk Index (RCRI):  The patient has the following serious cardiovascular risks for perioperative complications:   - No serious cardiac risks = 0 points     RCRI Interpretation: 0 points: Class I (very low risk - 0.4% complication rate)         Signed  Electronically by: Nirali Stephens MD  Copy of this evaluation report is provided to requesting physician.

## 2023-09-05 NOTE — PATIENT INSTRUCTIONS
Before Your Surgery     Call your surgeon if there is any change in your health. This includes signs of a cold or flu (such as a sore throat, runny nose, cough, rash, fever, urinary symptoms).   If you take prescribed drugs: Follow your doctor s orders about which medicines to take and which to stop until after surgery.  HOLD naproxen & ibuprofen 3 days prior  Hold topical creams day of surgery   Do not smoke, drink alcohol or take over the counter medicine (unless your surgeon or primary care doctor tells you to) for the 24 hours before and after surgery. Smoking can increase your risk of an infection. Nicotine patches are safe to use. NSAIDS like ibuprofen (Advil, Motrin) should be held one day before surgery. Celebrex should be held 3 days before surgery. Naproxen (Aleve) should be held four days before surgery.   Do not take supplements/vitamins day prior and morning of surgery. Some supplements/vitamins can interfere with anesthesia.   Eating and drinking prior to surgery: follow the instructions from your surgeon  Take a shower or bath the night before surgery and morning of surgery. Use the soap your surgeon gave you to gently clean your skin night before and morning of surgery. If you do not have soap from your surgeon, use your regular soap.[facility] patients can receive free Chlorhexidine Gluconate 4% soap for surgery at an outpatient [facility] pharmacy.  Do not shave the surgery site.  Wear clean pajamas and have clean sheets on your bed.  Brush teeth morning of surgery to reduce risk of infection.

## 2023-09-12 NOTE — OR NURSING
Instructed to hold ibuprofen & naprosyn starting today 9/12 - Patient states she stopped 2 weeks ago & denies taking any ASA, Vitamins or Supplements.

## 2023-09-15 ENCOUNTER — ANESTHESIA EVENT (OUTPATIENT)
Dept: SURGERY | Facility: HOSPITAL | Age: 28
End: 2023-09-15
Payer: COMMERCIAL

## 2023-09-15 NOTE — ANESTHESIA PREPROCEDURE EVALUATION
Anesthesia Pre-Procedure Evaluation    Patient: Tasneem Harrington   MRN: [id number] : 1995        Procedure : Procedure(s):  HYSTERECTOMY, VAGINAL, LAPAROSCOPY-ASSISTED          Past Medical History:   Diagnosis Date     Endometriosis      Endometriosis     diagnosed on laparoscopy - [facility]     Migraine      Postpartum depression 2019     Tinea versicolor       Past Surgical History:   Procedure Laterality Date     LAPAROSCOPY       TONSILLECTOMY       TUBAL LIGATION        Allergies   Allergen Reactions     Codeine Hives, Itching and Nausea     Latex Itching, Rash and Hives     Morphine Hives and Nausea and Vomiting     Sulfamethoxazole-Trimethoprim Hives, Nausea and Vomiting and Rash      Social History     Tobacco Use     Smoking status: Never     Smokeless tobacco: Never   Substance Use Topics     Alcohol use: Yes     Comment: rare      Wt Readings from Last 1 Encounters:   23 78.7 kg (173 lb 9.6 oz)        Anesthesia Evaluation   Pt has had prior anesthetic. Type: MAC and General.    History of anesthetic complications  - .  wakes up crying.    ROS/MED HX  ENT/Pulmonary: Comment: temporomandibular joint syndrome      Neurologic:     (+)      migraines,                          Cardiovascular:  - neg cardiovascular ROS   (+)  - -   -  - -                                 Previous cardiac testing   Echo: Date: Results:    Stress Test:  Date: Results:    ECG Reviewed:  Date: preop Results:  appears normal, NSR, normal axis, normal intervals, no acute ST/T changes c/w ischemia, no LVH by voltage criteria, there are no prior tracings available  Cath:  Date: Results:      METS/Exercise Tolerance: >4 METS    Hematologic:  - neg hematologic  ROS     Musculoskeletal:  - neg musculoskeletal ROS     GI/Hepatic: Comment:  Irritable bowel syndrome with constipation        Renal/Genitourinary: Comment: Endometriosis  Menorrhagia with regular cycle      Endo:  - neg endo ROS      Psychiatric/Substance Use:     (+) psychiatric history depression       Infectious Disease:  - neg infectious disease ROS     Malignancy:  - neg malignancy ROS     Other: Comment: Tinea versicolor - neg other ROS          Physical Exam    Airway        Mallampati: II   TM distance: > 3 FB   Neck ROM: full   Mouth opening: > 3 cm    Respiratory Devices and Support         Dental       (+) Minor Abnormalities - some fillings, tiny chips      Cardiovascular   cardiovascular exam normal       Rhythm and rate: regular and normal     Pulmonary   pulmonary exam normal        breath sounds clear to auscultation       OUTSIDE LABS:  CBC:   Lab Results   Component Value Date    WBC 7.9 09/05/2023    HGB 13.0 09/05/2023    HCT 39.6 09/05/2023     09/05/2023     BMP:   Lab Results   Component Value Date     09/05/2023    POTASSIUM 3.8 09/05/2023    CHLORIDE 104 09/05/2023    CO2 24 09/05/2023    BUN 15.2 09/05/2023    CR 0.69 09/05/2023    GLC 93 09/05/2023     COAGS: No results found for: PTT, INR, FIBR  POC: No results found for: BGM, HCG, HCGS  HEPATIC: No results found for: ALBUMIN, PROTTOTAL, ALT, AST, GGT, ALKPHOS, BILITOTAL, BILIDIRECT, BARRY  OTHER:   Lab Results   Component Value Date    SILVESTRE 9.9 09/05/2023       Anesthesia Plan    ASA Status:  2    NPO Status:  NPO Appropriate    Anesthesia Type: General.     - Airway: ETT   Induction: Propofol, Intravenous.   Maintenance: Balanced.        Consents    Anesthesia Plan(s) and associated risks, benefits, and realistic alternatives discussed. Questions answered and patient/representative(s) expressed understanding.     - Discussed: Risks, Benefits and Alternatives for BOTH SEDATION and the PROCEDURE were discussed     - Discussed with:  Patient      - Extended Intubation/Ventilatory Support Discussed: No.      - Patient is DNR/DNI Status: No     Use of blood products discussed: Yes (per surgeon).     Postoperative Care    Pain management: IV analgesics,  Multi-modal analgesia.   PONV prophylaxis: Ondansetron (or other 5HT-3), Dexamethasone or Solumedrol, Scopolamine patch, Background Propofol Infusion     Comments:    [facility]            CHUCHO COELHO CRNA

## 2023-09-20 ENCOUNTER — APPOINTMENT (OUTPATIENT)
Dept: LAB | Facility: HOSPITAL | Age: 28
End: 2023-09-20
Attending: OBSTETRICS & GYNECOLOGY
Payer: COMMERCIAL

## 2023-09-20 ENCOUNTER — HOSPITAL ENCOUNTER (OUTPATIENT)
Facility: HOSPITAL | Age: 28
Discharge: HOME | End: 2023-09-21
Attending: OBSTETRICS & GYNECOLOGY | Admitting: OBSTETRICS & GYNECOLOGY
Payer: COMMERCIAL

## 2023-09-20 ENCOUNTER — ANESTHESIA (OUTPATIENT)
Dept: SURGERY | Facility: HOSPITAL | Age: 28
End: 2023-09-20
Payer: COMMERCIAL

## 2023-09-20 DIAGNOSIS — G89.18 ACUTE POST-OPERATIVE PAIN: Primary | ICD-10-CM

## 2023-09-20 DIAGNOSIS — K59.03 DRUG-INDUCED CONSTIPATION: ICD-10-CM

## 2023-09-20 PROBLEM — Z41.9 SURGERY, ELECTIVE: Status: ACTIVE | Noted: 2023-09-20

## 2023-09-20 LAB
ABO/RH(D): NORMAL
ANTIBODY SCREEN: NEGATIVE
GLUCOSE BLDC GLUCOMTR-MCNC: 94 MG/DL (ref 70–99)
HCG UR QL: NEGATIVE
HOLD SPECIMEN: NORMAL
HOLD SPECIMEN: NORMAL
SPECIMEN EXPIRATION DATE: NORMAL

## 2023-09-20 PROCEDURE — 370N000017 HC ANESTHESIA TECHNICAL FEE, PER MIN: Performed by: OBSTETRICS & GYNECOLOGY

## 2023-09-20 PROCEDURE — 58550 LAPARO-ASST VAG HYSTERECTOMY: CPT | Performed by: OBSTETRICS & GYNECOLOGY

## 2023-09-20 PROCEDURE — 86901 BLOOD TYPING SEROLOGIC RH(D): CPT | Performed by: OBSTETRICS & GYNECOLOGY

## 2023-09-20 PROCEDURE — 272N000001 HC OR GENERAL SUPPLY STERILE: Performed by: OBSTETRICS & GYNECOLOGY

## 2023-09-20 PROCEDURE — 999N000141 HC STATISTIC PRE-PROCEDURE NURSING ASSESSMENT: Performed by: OBSTETRICS & GYNECOLOGY

## 2023-09-20 PROCEDURE — 250N000011 HC RX IP 250 OP 636: Mod: JZ | Performed by: NURSE ANESTHETIST, CERTIFIED REGISTERED

## 2023-09-20 PROCEDURE — 82962 GLUCOSE BLOOD TEST: CPT

## 2023-09-20 PROCEDURE — 58550 LAPARO-ASST VAG HYSTERECTOMY: CPT | Performed by: NURSE ANESTHETIST, CERTIFIED REGISTERED

## 2023-09-20 PROCEDURE — 250N000011 HC RX IP 250 OP 636: Performed by: NURSE ANESTHETIST, CERTIFIED REGISTERED

## 2023-09-20 PROCEDURE — 250N000009 HC RX 250: Performed by: NURSE ANESTHETIST, CERTIFIED REGISTERED

## 2023-09-20 PROCEDURE — 88307 TISSUE EXAM BY PATHOLOGIST: CPT | Mod: TC | Performed by: OBSTETRICS & GYNECOLOGY

## 2023-09-20 PROCEDURE — 250N000011 HC RX IP 250 OP 636: Mod: JZ | Performed by: OBSTETRICS & GYNECOLOGY

## 2023-09-20 PROCEDURE — 250N000013 HC RX MED GY IP 250 OP 250 PS 637: Performed by: OBSTETRICS & GYNECOLOGY

## 2023-09-20 PROCEDURE — 86850 RBC ANTIBODY SCREEN: CPT | Performed by: OBSTETRICS & GYNECOLOGY

## 2023-09-20 PROCEDURE — 81025 URINE PREGNANCY TEST: CPT | Performed by: NURSE ANESTHETIST, CERTIFIED REGISTERED

## 2023-09-20 PROCEDURE — 250N000025 HC SEVOFLURANE, PER MIN: Performed by: OBSTETRICS & GYNECOLOGY

## 2023-09-20 PROCEDURE — 258N000003 HC RX IP 258 OP 636: Performed by: NURSE ANESTHETIST, CERTIFIED REGISTERED

## 2023-09-20 PROCEDURE — 250N000011 HC RX IP 250 OP 636: Performed by: OBSTETRICS & GYNECOLOGY

## 2023-09-20 PROCEDURE — 36415 COLL VENOUS BLD VENIPUNCTURE: CPT | Performed by: OBSTETRICS & GYNECOLOGY

## 2023-09-20 PROCEDURE — 710N000010 HC RECOVERY PHASE 1, LEVEL 2, PER MIN: Performed by: OBSTETRICS & GYNECOLOGY

## 2023-09-20 PROCEDURE — 360N000077 HC SURGERY LEVEL 4, PER MIN: Performed by: OBSTETRICS & GYNECOLOGY

## 2023-09-20 PROCEDURE — 250N000009 HC RX 250: Performed by: OBSTETRICS & GYNECOLOGY

## 2023-09-20 PROCEDURE — 258N000003 HC RX IP 258 OP 636: Performed by: OBSTETRICS & GYNECOLOGY

## 2023-09-20 PROCEDURE — 999N000157 HC STATISTIC RCP TIME EA 10 MIN

## 2023-09-20 RX ORDER — LIDOCAINE 40 MG/G
CREAM TOPICAL
Status: DISCONTINUED | OUTPATIENT
Start: 2023-09-20 | End: 2023-09-20 | Stop reason: HOSPADM

## 2023-09-20 RX ORDER — FENTANYL CITRATE 50 UG/ML
INJECTION, SOLUTION INTRAMUSCULAR; INTRAVENOUS PRN
Status: DISCONTINUED | OUTPATIENT
Start: 2023-09-20 | End: 2023-09-20

## 2023-09-20 RX ORDER — NALOXONE HYDROCHLORIDE 0.4 MG/ML
0.4 INJECTION, SOLUTION INTRAMUSCULAR; INTRAVENOUS; SUBCUTANEOUS
Status: DISCONTINUED | OUTPATIENT
Start: 2023-09-20 | End: 2023-09-20

## 2023-09-20 RX ORDER — DIPHENHYDRAMINE HYDROCHLORIDE 50 MG/ML
25 INJECTION INTRAMUSCULAR; INTRAVENOUS EVERY 6 HOURS PRN
Status: DISCONTINUED | OUTPATIENT
Start: 2023-09-20 | End: 2023-09-20 | Stop reason: HOSPADM

## 2023-09-20 RX ORDER — SODIUM CHLORIDE, SODIUM LACTATE, POTASSIUM CHLORIDE, CALCIUM CHLORIDE 600; 310; 30; 20 MG/100ML; MG/100ML; MG/100ML; MG/100ML
INJECTION, SOLUTION INTRAVENOUS CONTINUOUS
Status: DISCONTINUED | OUTPATIENT
Start: 2023-09-20 | End: 2023-09-21 | Stop reason: HOSPADM

## 2023-09-20 RX ORDER — CALCIUM CARBONATE 500 MG/1
500 TABLET, CHEWABLE ORAL 4 TIMES DAILY PRN
Status: DISCONTINUED | OUTPATIENT
Start: 2023-09-20 | End: 2023-09-21 | Stop reason: HOSPADM

## 2023-09-20 RX ORDER — HYDROMORPHONE HYDROCHLORIDE 1 MG/ML
0.4 INJECTION, SOLUTION INTRAMUSCULAR; INTRAVENOUS; SUBCUTANEOUS EVERY 5 MIN PRN
Status: DISCONTINUED | OUTPATIENT
Start: 2023-09-20 | End: 2023-09-20 | Stop reason: HOSPADM

## 2023-09-20 RX ORDER — LABETALOL 20 MG/4 ML (5 MG/ML) INTRAVENOUS SYRINGE
10
Status: DISCONTINUED | OUTPATIENT
Start: 2023-09-20 | End: 2023-09-20 | Stop reason: HOSPADM

## 2023-09-20 RX ORDER — LIDOCAINE HYDROCHLORIDE AND EPINEPHRINE 10; 10 MG/ML; UG/ML
INJECTION, SOLUTION INFILTRATION; PERINEURAL
Status: DISCONTINUED
Start: 2023-09-20 | End: 2023-09-20 | Stop reason: HOSPADM

## 2023-09-20 RX ORDER — HYDROMORPHONE HYDROCHLORIDE 1 MG/ML
0.2 INJECTION, SOLUTION INTRAMUSCULAR; INTRAVENOUS; SUBCUTANEOUS EVERY 5 MIN PRN
Status: DISCONTINUED | OUTPATIENT
Start: 2023-09-20 | End: 2023-09-20 | Stop reason: HOSPADM

## 2023-09-20 RX ORDER — HYDROMORPHONE HCL IN WATER/PF 6 MG/30 ML
0.4 PATIENT CONTROLLED ANALGESIA SYRINGE INTRAVENOUS
Status: DISCONTINUED | OUTPATIENT
Start: 2023-09-20 | End: 2023-09-21 | Stop reason: HOSPADM

## 2023-09-20 RX ORDER — FENTANYL CITRATE 50 UG/ML
25 INJECTION, SOLUTION INTRAMUSCULAR; INTRAVENOUS EVERY 5 MIN PRN
Status: DISCONTINUED | OUTPATIENT
Start: 2023-09-20 | End: 2023-09-20 | Stop reason: HOSPADM

## 2023-09-20 RX ORDER — SODIUM CHLORIDE, SODIUM LACTATE, POTASSIUM CHLORIDE, CALCIUM CHLORIDE 600; 310; 30; 20 MG/100ML; MG/100ML; MG/100ML; MG/100ML
INJECTION, SOLUTION INTRAVENOUS CONTINUOUS
Status: DISCONTINUED | OUTPATIENT
Start: 2023-09-20 | End: 2023-09-20 | Stop reason: HOSPADM

## 2023-09-20 RX ORDER — KETOROLAC TROMETHAMINE 15 MG/ML
15 INJECTION, SOLUTION INTRAMUSCULAR; INTRAVENOUS EVERY 6 HOURS
Status: DISCONTINUED | OUTPATIENT
Start: 2023-09-20 | End: 2023-09-21 | Stop reason: HOSPADM

## 2023-09-20 RX ORDER — HYDROMORPHONE HCL IN WATER/PF 6 MG/30 ML
0.2 PATIENT CONTROLLED ANALGESIA SYRINGE INTRAVENOUS
Status: DISCONTINUED | OUTPATIENT
Start: 2023-09-20 | End: 2023-09-21 | Stop reason: HOSPADM

## 2023-09-20 RX ORDER — CEFAZOLIN SODIUM/WATER 2 G/20 ML
2 SYRINGE (ML) INTRAVENOUS SEE ADMIN INSTRUCTIONS
Status: DISCONTINUED | OUTPATIENT
Start: 2023-09-20 | End: 2023-09-20 | Stop reason: HOSPADM

## 2023-09-20 RX ORDER — SCOLOPAMINE TRANSDERMAL SYSTEM 1 MG/1
1 PATCH, EXTENDED RELEASE TRANSDERMAL ONCE
Status: COMPLETED | OUTPATIENT
Start: 2023-09-20 | End: 2023-09-21

## 2023-09-20 RX ORDER — BISACODYL 10 MG
10 SUPPOSITORY, RECTAL RECTAL DAILY PRN
Status: DISCONTINUED | OUTPATIENT
Start: 2023-09-20 | End: 2023-09-21 | Stop reason: HOSPADM

## 2023-09-20 RX ORDER — ONDANSETRON 2 MG/ML
4 INJECTION INTRAMUSCULAR; INTRAVENOUS EVERY 6 HOURS PRN
Status: DISCONTINUED | OUTPATIENT
Start: 2023-09-20 | End: 2023-09-21 | Stop reason: HOSPADM

## 2023-09-20 RX ORDER — DEXAMETHASONE SODIUM PHOSPHATE 4 MG/ML
INJECTION, SOLUTION INTRA-ARTICULAR; INTRALESIONAL; INTRAMUSCULAR; INTRAVENOUS; SOFT TISSUE PRN
Status: DISCONTINUED | OUTPATIENT
Start: 2023-09-20 | End: 2023-09-20

## 2023-09-20 RX ORDER — AMOXICILLIN 250 MG
1 CAPSULE ORAL 2 TIMES DAILY
Status: DISCONTINUED | OUTPATIENT
Start: 2023-09-20 | End: 2023-09-21 | Stop reason: HOSPADM

## 2023-09-20 RX ORDER — BUPIVACAINE HYDROCHLORIDE 2.5 MG/ML
INJECTION, SOLUTION EPIDURAL; INFILTRATION; INTRACAUDAL
Status: DISCONTINUED
Start: 2023-09-20 | End: 2023-09-20 | Stop reason: HOSPADM

## 2023-09-20 RX ORDER — NALOXONE HYDROCHLORIDE 0.4 MG/ML
0.2 INJECTION, SOLUTION INTRAMUSCULAR; INTRAVENOUS; SUBCUTANEOUS
Status: DISCONTINUED | OUTPATIENT
Start: 2023-09-20 | End: 2023-09-20

## 2023-09-20 RX ORDER — ALBUTEROL SULFATE 0.83 MG/ML
2.5 SOLUTION RESPIRATORY (INHALATION) EVERY 4 HOURS PRN
Status: DISCONTINUED | OUTPATIENT
Start: 2023-09-20 | End: 2023-09-20 | Stop reason: HOSPADM

## 2023-09-20 RX ORDER — LIDOCAINE 40 MG/G
CREAM TOPICAL
Status: DISCONTINUED | OUTPATIENT
Start: 2023-09-20 | End: 2023-09-21 | Stop reason: HOSPADM

## 2023-09-20 RX ORDER — ACETAMINOPHEN 325 MG/1
975 TABLET ORAL EVERY 6 HOURS
Status: DISCONTINUED | OUTPATIENT
Start: 2023-09-20 | End: 2023-09-21 | Stop reason: HOSPADM

## 2023-09-20 RX ORDER — PROPOFOL 10 MG/ML
INJECTION, EMULSION INTRAVENOUS PRN
Status: DISCONTINUED | OUTPATIENT
Start: 2023-09-20 | End: 2023-09-20

## 2023-09-20 RX ORDER — HYDROXYZINE HYDROCHLORIDE 50 MG/ML
50 INJECTION, SOLUTION INTRAMUSCULAR EVERY 6 HOURS PRN
Status: DISCONTINUED | OUTPATIENT
Start: 2023-09-20 | End: 2023-09-20

## 2023-09-20 RX ORDER — LIDOCAINE HYDROCHLORIDE 20 MG/ML
INJECTION, SOLUTION INFILTRATION; PERINEURAL PRN
Status: DISCONTINUED | OUTPATIENT
Start: 2023-09-20 | End: 2023-09-20

## 2023-09-20 RX ORDER — KETOROLAC TROMETHAMINE 30 MG/ML
30 INJECTION, SOLUTION INTRAMUSCULAR; INTRAVENOUS ONCE
Status: COMPLETED | OUTPATIENT
Start: 2023-09-20 | End: 2023-09-20

## 2023-09-20 RX ORDER — ONDANSETRON 2 MG/ML
4 INJECTION INTRAMUSCULAR; INTRAVENOUS EVERY 30 MIN PRN
Status: DISCONTINUED | OUTPATIENT
Start: 2023-09-20 | End: 2023-09-20 | Stop reason: HOSPADM

## 2023-09-20 RX ORDER — LIDOCAINE HYDROCHLORIDE AND EPINEPHRINE 10; 10 MG/ML; UG/ML
INJECTION, SOLUTION INFILTRATION; PERINEURAL PRN
Status: DISCONTINUED | OUTPATIENT
Start: 2023-09-20 | End: 2023-09-21 | Stop reason: HOSPADM

## 2023-09-20 RX ORDER — ONDANSETRON 2 MG/ML
4 INJECTION INTRAMUSCULAR; INTRAVENOUS EVERY 30 MIN PRN
Status: DISCONTINUED | OUTPATIENT
Start: 2023-09-20 | End: 2023-09-20

## 2023-09-20 RX ORDER — ACETAMINOPHEN 325 MG/1
975 TABLET ORAL ONCE
Status: COMPLETED | OUTPATIENT
Start: 2023-09-20 | End: 2023-09-20

## 2023-09-20 RX ORDER — PROCHLORPERAZINE MALEATE 10 MG
10 TABLET ORAL EVERY 6 HOURS PRN
Status: DISCONTINUED | OUTPATIENT
Start: 2023-09-20 | End: 2023-09-21 | Stop reason: HOSPADM

## 2023-09-20 RX ORDER — FENTANYL CITRATE 50 UG/ML
50 INJECTION, SOLUTION INTRAMUSCULAR; INTRAVENOUS EVERY 5 MIN PRN
Status: DISCONTINUED | OUTPATIENT
Start: 2023-09-20 | End: 2023-09-20 | Stop reason: HOSPADM

## 2023-09-20 RX ORDER — CEFAZOLIN SODIUM/WATER 2 G/20 ML
2 SYRINGE (ML) INTRAVENOUS
Status: COMPLETED | OUTPATIENT
Start: 2023-09-20 | End: 2023-09-20

## 2023-09-20 RX ORDER — ACETAMINOPHEN 325 MG/1
650 TABLET ORAL EVERY 6 HOURS
Status: DISCONTINUED | OUTPATIENT
Start: 2023-09-23 | End: 2023-09-21 | Stop reason: HOSPADM

## 2023-09-20 RX ORDER — NALOXONE HYDROCHLORIDE 0.4 MG/ML
0.4 INJECTION, SOLUTION INTRAMUSCULAR; INTRAVENOUS; SUBCUTANEOUS
Status: DISCONTINUED | OUTPATIENT
Start: 2023-09-20 | End: 2023-09-21 | Stop reason: HOSPADM

## 2023-09-20 RX ORDER — NALOXONE HYDROCHLORIDE 0.4 MG/ML
0.2 INJECTION, SOLUTION INTRAMUSCULAR; INTRAVENOUS; SUBCUTANEOUS
Status: DISCONTINUED | OUTPATIENT
Start: 2023-09-20 | End: 2023-09-21 | Stop reason: HOSPADM

## 2023-09-20 RX ORDER — KETAMINE HYDROCHLORIDE 10 MG/ML
INJECTION INTRAMUSCULAR; INTRAVENOUS PRN
Status: DISCONTINUED | OUTPATIENT
Start: 2023-09-20 | End: 2023-09-20

## 2023-09-20 RX ORDER — PHENAZOPYRIDINE HYDROCHLORIDE 100 MG/1
200 TABLET, FILM COATED ORAL ONCE
Status: COMPLETED | OUTPATIENT
Start: 2023-09-20 | End: 2023-09-20

## 2023-09-20 RX ORDER — HYDROMORPHONE HYDROCHLORIDE 2 MG/1
4 TABLET ORAL EVERY 4 HOURS PRN
Status: DISCONTINUED | OUTPATIENT
Start: 2023-09-20 | End: 2023-09-21 | Stop reason: HOSPADM

## 2023-09-20 RX ORDER — ONDANSETRON 4 MG/1
4 TABLET, ORALLY DISINTEGRATING ORAL EVERY 30 MIN PRN
Status: DISCONTINUED | OUTPATIENT
Start: 2023-09-20 | End: 2023-09-20 | Stop reason: HOSPADM

## 2023-09-20 RX ORDER — METRONIDAZOLE 500 MG/100ML
500 INJECTION, SOLUTION INTRAVENOUS ONCE
Status: COMPLETED | OUTPATIENT
Start: 2023-09-20 | End: 2023-09-20

## 2023-09-20 RX ORDER — ONDANSETRON 4 MG/1
4 TABLET, ORALLY DISINTEGRATING ORAL EVERY 6 HOURS PRN
Status: DISCONTINUED | OUTPATIENT
Start: 2023-09-20 | End: 2023-09-21 | Stop reason: HOSPADM

## 2023-09-20 RX ORDER — HYDROXYZINE HYDROCHLORIDE 50 MG/ML
100 INJECTION, SOLUTION INTRAMUSCULAR EVERY 6 HOURS PRN
Status: DISCONTINUED | OUTPATIENT
Start: 2023-09-20 | End: 2023-09-21 | Stop reason: HOSPADM

## 2023-09-20 RX ORDER — HYDROMORPHONE HYDROCHLORIDE 2 MG/1
2 TABLET ORAL EVERY 4 HOURS PRN
Status: DISCONTINUED | OUTPATIENT
Start: 2023-09-20 | End: 2023-09-21 | Stop reason: HOSPADM

## 2023-09-20 RX ORDER — TRANEXAMIC ACID 10 MG/ML
1 INJECTION, SOLUTION INTRAVENOUS ONCE
Status: COMPLETED | OUTPATIENT
Start: 2023-09-20 | End: 2023-09-20

## 2023-09-20 RX ORDER — KETOROLAC TROMETHAMINE 30 MG/ML
30 INJECTION, SOLUTION INTRAMUSCULAR; INTRAVENOUS EVERY 6 HOURS PRN
Status: DISCONTINUED | OUTPATIENT
Start: 2023-09-20 | End: 2023-09-20

## 2023-09-20 RX ORDER — DIPHENHYDRAMINE HCL 25 MG
25 CAPSULE ORAL EVERY 6 HOURS PRN
Status: DISCONTINUED | OUTPATIENT
Start: 2023-09-20 | End: 2023-09-20 | Stop reason: HOSPADM

## 2023-09-20 RX ORDER — HYDRALAZINE HYDROCHLORIDE 20 MG/ML
2.5-5 INJECTION INTRAMUSCULAR; INTRAVENOUS EVERY 10 MIN PRN
Status: DISCONTINUED | OUTPATIENT
Start: 2023-09-20 | End: 2023-09-20 | Stop reason: HOSPADM

## 2023-09-20 RX ORDER — ONDANSETRON 4 MG/1
4 TABLET, ORALLY DISINTEGRATING ORAL EVERY 30 MIN PRN
Status: DISCONTINUED | OUTPATIENT
Start: 2023-09-20 | End: 2023-09-20

## 2023-09-20 RX ORDER — IBUPROFEN 800 MG/1
800 TABLET, FILM COATED ORAL EVERY 6 HOURS
Status: DISCONTINUED | OUTPATIENT
Start: 2023-09-20 | End: 2023-09-21 | Stop reason: HOSPADM

## 2023-09-20 RX ORDER — ONDANSETRON 2 MG/ML
INJECTION INTRAMUSCULAR; INTRAVENOUS PRN
Status: DISCONTINUED | OUTPATIENT
Start: 2023-09-20 | End: 2023-09-20

## 2023-09-20 RX ORDER — HALOPERIDOL 5 MG/ML
1 INJECTION INTRAMUSCULAR
Status: DISCONTINUED | OUTPATIENT
Start: 2023-09-20 | End: 2023-09-20 | Stop reason: HOSPADM

## 2023-09-20 RX ADMIN — FENTANYL CITRATE 50 MCG: 50 INJECTION, SOLUTION INTRAMUSCULAR; INTRAVENOUS at 10:57

## 2023-09-20 RX ADMIN — Medication 2 G: at 09:35

## 2023-09-20 RX ADMIN — SCOPALAMINE 1 PATCH: 1 PATCH, EXTENDED RELEASE TRANSDERMAL at 09:04

## 2023-09-20 RX ADMIN — KETOROLAC TROMETHAMINE 30 MG: 30 INJECTION, SOLUTION INTRAMUSCULAR; INTRAVENOUS at 12:14

## 2023-09-20 RX ADMIN — HYDROMORPHONE HYDROCHLORIDE 2 MG: 2 TABLET ORAL at 13:32

## 2023-09-20 RX ADMIN — ONDANSETRON 4 MG: 2 INJECTION INTRAMUSCULAR; INTRAVENOUS at 10:59

## 2023-09-20 RX ADMIN — Medication 30 MG: at 09:48

## 2023-09-20 RX ADMIN — TRANEXAMIC ACID 1 G: 10 INJECTION, SOLUTION INTRAVENOUS at 09:48

## 2023-09-20 RX ADMIN — MIDAZOLAM 2 MG: 1 INJECTION INTRAMUSCULAR; INTRAVENOUS at 09:35

## 2023-09-20 RX ADMIN — METRONIDAZOLE 500 MG: 500 INJECTION, SOLUTION INTRAVENOUS at 09:04

## 2023-09-20 RX ADMIN — ACETAMINOPHEN 975 MG: 325 TABLET, FILM COATED ORAL at 21:11

## 2023-09-20 RX ADMIN — SODIUM CHLORIDE, POTASSIUM CHLORIDE, SODIUM LACTATE AND CALCIUM CHLORIDE: 600; 310; 30; 20 INJECTION, SOLUTION INTRAVENOUS at 11:41

## 2023-09-20 RX ADMIN — KETOROLAC TROMETHAMINE 15 MG: 15 INJECTION, SOLUTION INTRAMUSCULAR; INTRAVENOUS at 18:06

## 2023-09-20 RX ADMIN — FENTANYL CITRATE 50 MCG: 50 INJECTION, SOLUTION INTRAMUSCULAR; INTRAVENOUS at 09:45

## 2023-09-20 RX ADMIN — ROCURONIUM BROMIDE 40 MG: 10 INJECTION INTRAVENOUS at 09:49

## 2023-09-20 RX ADMIN — HYDROXYZINE HYDROCHLORIDE 50 MG: 50 INJECTION, SOLUTION INTRAMUSCULAR at 11:35

## 2023-09-20 RX ADMIN — SUGAMMADEX 150 MG: 100 INJECTION, SOLUTION INTRAVENOUS at 10:57

## 2023-09-20 RX ADMIN — SENNOSIDES AND DOCUSATE SODIUM 1 TABLET: 50; 8.6 TABLET ORAL at 21:11

## 2023-09-20 RX ADMIN — ACETAMINOPHEN 975 MG: 325 TABLET, FILM COATED ORAL at 15:06

## 2023-09-20 RX ADMIN — SODIUM CHLORIDE, POTASSIUM CHLORIDE, SODIUM LACTATE AND CALCIUM CHLORIDE: 600; 310; 30; 20 INJECTION, SOLUTION INTRAVENOUS at 11:32

## 2023-09-20 RX ADMIN — Medication 20 MG: at 10:04

## 2023-09-20 RX ADMIN — PROPOFOL 170 MG: 10 INJECTION, EMULSION INTRAVENOUS at 09:46

## 2023-09-20 RX ADMIN — SODIUM CHLORIDE, POTASSIUM CHLORIDE, SODIUM LACTATE AND CALCIUM CHLORIDE: 600; 310; 30; 20 INJECTION, SOLUTION INTRAVENOUS at 13:34

## 2023-09-20 RX ADMIN — DEXAMETHASONE SODIUM PHOSPHATE 10 MG: 4 INJECTION, SOLUTION INTRA-ARTICULAR; INTRALESIONAL; INTRAMUSCULAR; INTRAVENOUS; SOFT TISSUE at 09:55

## 2023-09-20 RX ADMIN — ONDANSETRON 4 MG: 2 INJECTION INTRAMUSCULAR; INTRAVENOUS at 11:34

## 2023-09-20 RX ADMIN — PHENAZOPYRIDINE 200 MG: 100 TABLET ORAL at 09:05

## 2023-09-20 RX ADMIN — ROCURONIUM BROMIDE 10 MG: 10 INJECTION INTRAVENOUS at 10:46

## 2023-09-20 RX ADMIN — LIDOCAINE HYDROCHLORIDE 40 MG: 20 INJECTION, SOLUTION INFILTRATION; PERINEURAL at 09:46

## 2023-09-20 RX ADMIN — Medication 80 MG: at 09:46

## 2023-09-20 RX ADMIN — ACETAMINOPHEN 975 MG: 325 TABLET, FILM COATED ORAL at 09:04

## 2023-09-20 RX ADMIN — SODIUM CHLORIDE, POTASSIUM CHLORIDE, SODIUM LACTATE AND CALCIUM CHLORIDE: 600; 310; 30; 20 INJECTION, SOLUTION INTRAVENOUS at 09:07

## 2023-09-20 RX ADMIN — FENTANYL CITRATE 50 MCG: 50 INJECTION, SOLUTION INTRAMUSCULAR; INTRAVENOUS at 09:41

## 2023-09-20 ASSESSMENT — ACTIVITIES OF DAILY LIVING (ADL)
ADLS_ACUITY_SCORE: 18
ADLS_ACUITY_SCORE: 35
ADLS_ACUITY_SCORE: 18

## 2023-09-20 NOTE — OR NURSING
Patient is awake/alert and interactive.  Patient notes that her headache and abdominal pain are better; declines IV narcotic pain medication at this time.  Denies any nausea; notes that she feels like she is ready to go upstairs.  Patient's  called and bed assignment given.

## 2023-09-20 NOTE — ANESTHESIA CARE TRANSFER NOTE
Patient: Tasneem Harrington    Procedure: Procedure(s):  HYSTERECTOMY, VAGINAL, LAPAROSCOPY-ASSISTED, Lysis of adhesions, fulguration of endometriosis       Diagnosis: Endometriosis [N80.9]  Dysmenorrhea [N94.6]  Diagnosis Additional Information: No value filed.    Anesthesia Type:   General     Note:    Oropharynx: oropharynx clear of all foreign objects and spontaneously breathing  Level of Consciousness: drowsy  Oxygen Supplementation: nasal cannula  Level of Supplemental Oxygen (L/min / FiO2): 2  Independent Airway: airway patency satisfactory and stable  Dentition: dentition unchanged  Vital Signs Stable: post-procedure vital signs reviewed and stable  Report to RN Given: handoff report given  Patient transferred to: PACU    Handoff Report: Identifed the Patient, Identified the Reponsible Provider, Reviewed the pertinent medical history, Discussed the surgical course, Reviewed Intra-OP anesthesia mangement and issues during anesthesia, Set expectations for post-procedure period and Allowed opportunity for questions and acknowledgement of understanding  Vitals:  Vitals Value Taken Time   /69 09/20/23 1115   Temp     Pulse 86 09/20/23 1116   Resp 16 09/20/23 1116   SpO2 98 % 09/20/23 1116   Vitals shown include unvalidated device data.    Electronically Signed By: HCUCHO Anna CRNA  September 20, 2023  11:17 AM

## 2023-09-20 NOTE — INTERVAL H&P NOTE
I have reviewed the surgical (or preoperative) H&P that is linked to this encounter, and examined the patient. There are no significant changes.  Reviewed goals, risks, alternatives for planned procedure;  Including risk of bleeding, transfusion, infection, damage to nerves, blood vessels, bowel and bladder, blood clots, pneumonia, and other rare or unforseen complications.   Discussed recovery period and expected discomfort.. All questions were answered.  Consent form reviewed and signed.  Patient desires to proceed.

## 2023-09-20 NOTE — ANESTHESIA POSTPROCEDURE EVALUATION
Patient: Tasneem Harrington    Procedure: Procedure(s):  HYSTERECTOMY, VAGINAL, LAPAROSCOPY-ASSISTED, Lysis of adhesions, fulguration of endometriosis       Anesthesia Type:  General    Note:  Disposition: Admission   Postop Pain Control: Uneventful            Sign Out: Well controlled pain   PONV: No   Neuro/Psych: Uneventful            Sign Out: Acceptable/Baseline neuro status   Airway/Respiratory: Uneventful            Sign Out: Acceptable/Baseline resp. status   CV/Hemodynamics: Uneventful            Sign Out: Acceptable CV status; No obvious hypovolemia; No obvious fluid overload   Other NRE: NONE   DID A NON-ROUTINE EVENT OCCUR?            Last vitals:  Vitals Value Taken Time   /68 09/20/23 1240   Temp 98.1  F (36.7  C) 09/20/23 1240   Pulse 97 09/20/23 1240   Resp 19 09/20/23 1240   SpO2 97 % 09/20/23 1240       Electronically Signed By: CHUCHO Spaulding CRNA  September 20, 2023  1:48 PM

## 2023-09-20 NOTE — OP NOTE
[facility]    Pre-operative diagnosis: Endometriosis [N80.9]  Dysmenorrhea [N94.6]   Post-operative diagnosis Same, RLQ abdominal adhesions.    Procedure: Procedure(s):  HYSTERECTOMY, VAGINAL, LAPAROSCOPY-ASSISTED  Lysis of adhesions  Fulguration of pelvic endometriosis.   Cystoscopy   Surgeon:  Assistant: MD Mitzi Bustamante NP (assistance required for retraction, exposure, instrument handling, and wound closure)      Anesthesia: General    Estimated blood loss: Less than 100 ml   Blood transfusion: No transfusion was given during surgery   Drains: None   Specimens: Uterus with cervix   Findings: RLQ abdominal adhesions.  Glandular peritoneal implants,  superficial, involving bilateral uterosacral ligaments, posterior cul-de-sac, and right pelvic sidewall.   Absent fallopian tubes.  Otherwise normal pelvic anatomy.  On cystoscopy there was no evidence of sutures or perforation and bilateral ureteral jets were noted.   Complications: None   Condition: Stable         OPERATIVE DICTATION: The patient was brought to the operating room and uneventfully placed under general anesthesia. She was prepped and draped in the dorsal lithotomy position and her bladder drained. The cervix was visualized with a weighted speculum and grasped anteriorly with a fine-tooth tenaculum. The cervix was gently dilated with Tabares dilators and a uterine manipulating device placed. We then changed gloves and proceeded to the abdominal portion of the case. A 5 mm infraumbilical skin incision was performed with a scalpel. A Veress needle was introduced without difficulty and a water drop test performed. The abdomen was insufflated with several liters of carbon dioxide. A 5 mm trocar and the laparoscope were introduced. Visualization was excellent. Findings were as described above. Next, two lower lateral 5 mm trocars were placed under direct visualization. After initial exploration, the uterus was elevated.  All  visible peritoneal endometriotic implants were fulgurated with monopolar cautery.  The RLQ adhesions werel lysed with sharp and blunt dissection.  The left uterine cornu was transected with the LigaSure bipolar cutting cautery device. The dissection was continued down through the utero-ovarian, round and broad ligament complexes. A bladder flap was initiated using sharp and blunt dissection and the uterine artery skeletonized within the cardinal ligament. The uterine artery was cauterized with the LigaSure. The same procedure was repeated on the patient's right side and the bladder flap was completed in the midline. At this point, the pelvis was checked and found to be hemostatic. We then proceeded to the vaginal portion of the case. Excess carbon dioxide was expressed from the abdomen but the trocars left in place. The patient was placed in the high lithotomy position. The uterine manipulating device was removed and the cervix visualized with a weighted speculum. The cervix was grasped with two fine-toothed tenaculum. A circumferential cervical incision was performed with a scalpel. Posterior colpotomy was performed sharply without difficulty. The uterosacral ligaments were clamped, cut and suture ligated with 0 Vicryl. The bladder was then dissected anteriorly off the lower uterine segment and cervix and anterior colpotomy performed. A Provo retractor was placed to displace the bladder superiorly. The remaining cardinal ligament complexes were clamped, cut and suture ligated with 0 Vicryl. The uterus was removed. A pack was placed to displace the bowel out of the pelvis. An external Ortiz suture was performed in the usual fashion using 0 Vicryl and plicating the uterosacral ligaments high in the pelvis. The Ortiz's suture was tagged. A reperitonealizing stitch of 0 Vicryl was placed in a pursestring fashion incorporating the uterosacral ligament into the closure. The packing was removed and the pursestring  tied. The vaginal cuff was closed with interrupted 2-0 Vicryl sutures with the uterosacral ligament complexes being incorporated it into the vaginal angle sutures. The cuff was irrigated and checked for hemostasis. The Ortiz's suture was tied with resulting excellent apical vaginal support. Cystoscopy was then performed using a 70-degree rigid cystoscope with normal saline as distention media. Visualization was excellent. Bilateral ureteral jets were noted. There is no evidence of bladder perforation or suture. The cystoscope was withdrawn and a Edwards catheter placed. We then changed gloves and re-proceeded with laparoscopy. The abdomen was reinsufflated with carbon dioxide. The pelvis was irrigated and found to be hemostatic. The trocars were then removed under direct visualization and we proceeded to closure. The subcutaneous spaces were irrigated and checked for hemostasis. The skin incisions were closed with surgical glue. There were no complications and the patient was transferred to the recovery room in excellent stable condition.   Yevgeniy Brenner MD  9/20/2023  9:27 AM

## 2023-09-20 NOTE — ANESTHESIA PROCEDURE NOTES
Airway         Procedure Start/Stop Times: 9/20/2023 9:47 AM  Staff -        CRNA: Luz Barnett APRN CRNA       Performed By: CRNA  Consent for Airway        Urgency: elective  Indications and Patient Condition       Indications for airway management: elroy-procedural       Induction type:intravenous       Mask difficulty assessment: 1 - vent by mask    Final Airway Details       Final airway type: endotracheal airway       Successful airway: ETT - single  Endotracheal Airway Details        ETT size (mm): 7.0       Cuffed: yes       Successful intubation technique: video laryngoscopy       VL Blade Size: Glidescope 3 (glidescope go used for learning)       Grade View of Cords: 1       Adjucts: stylet       Position: Right       Measured from: lips       Secured at (cm): 20       Bite block used: None    Post intubation assessment        Placement verified by: capnometry, equal breath sounds and chest rise        Number of attempts at approach: 1       Number of other approaches attempted: 0       Secured with: commercial tube nava and plastic tape       Ease of procedure: easy       Dentition: Intact and Unchanged    Medication(s) Administered   Medication Administration Time: 9/20/2023 9:47 AM

## 2023-09-20 NOTE — INTERVAL H&P NOTE
"I have reviewed the surgical (or preoperative) H&P that is linked to this encounter, and examined the patient. There are no significant changes    Clinical Conditions Present on Arrival:  Clinically Significant Risk Factors Present on Admission                  # Overweight: Estimated body mass index is 28.89 kg/m  as calculated from the following:    Height as of 9/5/23: 1.651 m (5' 5\").    Weight as of 9/5/23: 78.7 kg (173 lb 9.6 oz).       "

## 2023-09-20 NOTE — OR NURSING
Patient arrives from the OR via stretcher.  Patient is sedated at this time.  Bedside report received from CRNA and OR nurse.  Patient placed on cardiac monitor, bp monitor and pulse oximetry on.  Patient is  sedated, regular respirations, incisions x 3 intact; wang draining dark yellow urine; rest of nursing assessment as charted.

## 2023-09-20 NOTE — OR NURSING
PACU Respiratory Event Documentation     1) Episodes of Apnea greater than or equal to 10 seconds: 0    2) Bradypnea - less than 8 breaths per minute: 0    3) Pain score on 0 to 10 scale: 5    4) Pain-sedation mismatch (yes or no): no    5) Repeated 02 desaturation less than 90% (yes or no): no    Anesthesia notified? (yes or no): n/a    Any of the above events occuring repeatedly in separate 30 minute intervals may be considered recurrent PACU respiratory events.

## 2023-09-21 VITALS
SYSTOLIC BLOOD PRESSURE: 102 MMHG | RESPIRATION RATE: 16 BRPM | OXYGEN SATURATION: 99 % | WEIGHT: 173 LBS | HEART RATE: 73 BPM | BODY MASS INDEX: 28.82 KG/M2 | TEMPERATURE: 98.5 F | HEIGHT: 65 IN | DIASTOLIC BLOOD PRESSURE: 67 MMHG

## 2023-09-21 LAB
ANION GAP SERPL CALCULATED.3IONS-SCNC: 9 MMOL/L (ref 7–15)
BUN SERPL-MCNC: 13.6 MG/DL (ref 6–20)
CALCIUM SERPL-MCNC: 8.9 MG/DL (ref 8.6–10)
CHLORIDE SERPL-SCNC: 107 MMOL/L (ref 98–107)
CREAT SERPL-MCNC: 0.67 MG/DL (ref 0.51–0.95)
DEPRECATED HCO3 PLAS-SCNC: 23 MMOL/L (ref 22–29)
EGFRCR SERPLBLD CKD-EPI 2021: >90 ML/MIN/1.73M2
ERYTHROCYTE [DISTWIDTH] IN BLOOD BY AUTOMATED COUNT: 13.9 % (ref 10–15)
GLUCOSE SERPL-MCNC: 102 MG/DL (ref 70–99)
HCT VFR BLD AUTO: 35.1 % (ref 35–47)
HGB BLD-MCNC: 11.3 G/DL (ref 11.7–15.7)
MCH RBC QN AUTO: 27.6 PG (ref 26.5–33)
MCHC RBC AUTO-ENTMCNC: 32.2 G/DL (ref 31.5–36.5)
MCV RBC AUTO: 86 FL (ref 78–100)
PLATELET # BLD AUTO: 231 10E3/UL (ref 150–450)
POTASSIUM SERPL-SCNC: 3.7 MMOL/L (ref 3.4–5.3)
RBC # BLD AUTO: 4.09 10E6/UL (ref 3.8–5.2)
SODIUM SERPL-SCNC: 139 MMOL/L (ref 136–145)
WBC # BLD AUTO: 10.9 10E3/UL (ref 4–11)

## 2023-09-21 PROCEDURE — 85014 HEMATOCRIT: CPT | Performed by: OBSTETRICS & GYNECOLOGY

## 2023-09-21 PROCEDURE — 80048 BASIC METABOLIC PNL TOTAL CA: CPT | Performed by: OBSTETRICS & GYNECOLOGY

## 2023-09-21 PROCEDURE — 250N000013 HC RX MED GY IP 250 OP 250 PS 637: Performed by: OBSTETRICS & GYNECOLOGY

## 2023-09-21 PROCEDURE — 250N000013 HC RX MED GY IP 250 OP 250 PS 637: Performed by: NURSE PRACTITIONER

## 2023-09-21 PROCEDURE — 36415 COLL VENOUS BLD VENIPUNCTURE: CPT | Performed by: OBSTETRICS & GYNECOLOGY

## 2023-09-21 RX ORDER — SIMETHICONE 80 MG
80 TABLET,CHEWABLE ORAL EVERY 6 HOURS PRN
Status: DISCONTINUED | OUTPATIENT
Start: 2023-09-21 | End: 2023-09-21 | Stop reason: HOSPADM

## 2023-09-21 RX ORDER — HYDROMORPHONE HYDROCHLORIDE 4 MG/1
4 TABLET ORAL EVERY 4 HOURS PRN
Qty: 10 TABLET | Refills: 0 | Status: SHIPPED | OUTPATIENT
Start: 2023-09-21 | End: 2023-10-19

## 2023-09-21 RX ORDER — SIMETHICONE 80 MG
80 TABLET,CHEWABLE ORAL EVERY 6 HOURS PRN
COMMUNITY
Start: 2023-09-21 | End: 2023-09-28

## 2023-09-21 RX ORDER — ACETAMINOPHEN 325 MG/1
650 TABLET ORAL EVERY 6 HOURS
COMMUNITY
Start: 2023-09-23 | End: 2023-10-19

## 2023-09-21 RX ADMIN — IBUPROFEN 800 MG: 800 TABLET, FILM COATED ORAL at 08:29

## 2023-09-21 RX ADMIN — ACETAMINOPHEN 975 MG: 325 TABLET, FILM COATED ORAL at 08:28

## 2023-09-21 RX ADMIN — ACETAMINOPHEN 975 MG: 325 TABLET, FILM COATED ORAL at 02:32

## 2023-09-21 RX ADMIN — SENNOSIDES AND DOCUSATE SODIUM 1 TABLET: 50; 8.6 TABLET ORAL at 08:29

## 2023-09-21 RX ADMIN — IBUPROFEN 800 MG: 800 TABLET, FILM COATED ORAL at 02:32

## 2023-09-21 RX ADMIN — SIMETHICONE 80 MG: 80 TABLET, CHEWABLE ORAL at 09:38

## 2023-09-21 ASSESSMENT — ACTIVITIES OF DAILY LIVING (ADL)
ADLS_ACUITY_SCORE: 18

## 2023-09-21 NOTE — PLAN OF CARE
Goal Outcome Evaluation:                      Face to face report given with opportunity to observe patient.    Report given to lilian Hassan RN   9/21/2023  7:27 AM

## 2023-09-21 NOTE — DISCHARGE INSTRUCTIONS
No heavy lifting greater than 10 lb for 4 weeks  No driving while on pain meds  Pelvic rest for 4 weeks  No vigorous activity, exercise, swim, bath for 4 weeks  Schedule Postop check Dr. Brenner 4 weeks  Call Dr. Brenner [phone] as necessary if problems in interim

## 2023-09-21 NOTE — PROGRESS NOTES
"/67   Pulse 73   Temp 98.5  F (36.9  C) (Oral)   Resp 16   Ht 1.651 m (5' 5\")   Wt 78.5 kg (173 lb)   LMP 08/31/2023 (Approximate)   SpO2 99%   BMI 28.79 kg/m      Patient VSS, pain reported 2/10 incisional soreness/managed well with tylenol and ibuprofen use.  Incisions approximated, with no drainage and no signs of infection.  Patient independently ambulating and voiding without difficulty.  Ate 50% breakfast this am.  Gave simethicone for gas pains.  Pt expected to discharge soon when  arrives.  Ambulated in hallways.  "

## 2023-09-21 NOTE — DISCHARGE SUMMARY
"Discharge Summary    Tasneem Harrington MRN# [id number]   YOB: 1995 Age: 28 year old     Date of Admission:  9/20/2023  Date of Discharge:  9/21/2023  Admitting Physician:  Yevgeniy Brenner MD  Discharge Physician:  Mitzi Covarrubias NP  Discharging Service:  Obstetrics and Gynecology     Primary Provider: Nirali Stephens          Admission Diagnoses:   Endometriosis [N80.9]  Dysmenorrhea [N94.6]  Surgery, elective [Z41.9]          Discharge Diagnosis:     Patient Active Problem List   Diagnosis    Endometriosis    Irritable bowel syndrome with constipation    Intractable migraine with aura without status migrainosus    Oral herpes    Menorrhagia with regular cycle    TMJ (temporomandibular joint syndrome)    Depression    Surgery, elective                Discharge Disposition:     Discharged to home           Condition on Discharge:     Discharge condition: Stable   Discharge vitals: Blood pressure 102/67, pulse 73, temperature 98.5  F (36.9  C), temperature source Oral, resp. rate 16, height 1.651 m (5' 5\"), weight 78.5 kg (173 lb), last menstrual period 08/31/2023, SpO2 99 %.   Code status on discharge: Full Code           Procedures / Labs / Imaging:   Invasive procedures: see Epic          Medications Prior to Admission:     Medications Prior to Admission   Medication Sig Dispense Refill Last Dose    diclofenac (VOLTAREN) 1 % topical gel Apply 2 g topically 3 times daily 100 g 1 Unknown    ibuprofen (ADVIL/MOTRIN) 800 MG tablet Take 1 tablet (800 mg) by mouth every 8 hours as needed for moderate pain or inflammatory pain (migraines) Do not take with naproxen. 90 tablet 1 Past Week    naproxen (NAPROSYN) 500 MG tablet Take 1 tablet (500 mg) by mouth 2 times daily as needed for moderate pain (menses related pain) 30 tablet 3 Past Month    triamcinolone (KENALOG) 0.1 % external cream    More than a month             Discharge Medications:     Current Discharge Medication List        START taking these " medications    Details   acetaminophen (TYLENOL) 325 MG tablet Take 2 tablets (650 mg) by mouth every 6 hours    Associated Diagnoses: Acute post-operative pain      HYDROmorphone (DILAUDID) 4 MG tablet Take 1 tablet (4 mg) by mouth every 4 hours as needed for severe pain  Qty: 10 tablet, Refills: 0    Associated Diagnoses: Acute post-operative pain      magnesium hydroxide (MILK OF MAGNESIA) 400 MG/5ML suspension Take 30 mLs by mouth daily as needed for constipation (Use if preventive measures (senna-docusate, docusate, and polyethylene glycol) are not effective.)    Associated Diagnoses: Drug-induced constipation      simethicone (MYLICON) 80 MG chewable tablet Take 1 tablet (80 mg) by mouth every 6 hours as needed for cramping    Associated Diagnoses: Acute post-operative pain           CONTINUE these medications which have NOT CHANGED    Details   diclofenac (VOLTAREN) 1 % topical gel Apply 2 g topically 3 times daily  Qty: 100 g, Refills: 1    Associated Diagnoses: TMJ (temporomandibular joint syndrome)      ibuprofen (ADVIL/MOTRIN) 800 MG tablet Take 1 tablet (800 mg) by mouth every 8 hours as needed for moderate pain or inflammatory pain (migraines) Do not take with naproxen.  Qty: 90 tablet, Refills: 1    Associated Diagnoses: Intractable migraine with aura without status migrainosus      naproxen (NAPROSYN) 500 MG tablet Take 1 tablet (500 mg) by mouth 2 times daily as needed for moderate pain (menses related pain)  Qty: 30 tablet, Refills: 3    Associated Diagnoses: Menorrhagia with regular cycle      triamcinolone (KENALOG) 0.1 % external cream                    Consultations:     No consultations were requested during this admission             Brief History of Illness:   Tasneem Harrington is a 28 year old female who was admitted for [facility]. Uncomplicated surgical and post op course.  Discharged to home on post op day #1          Hospital Course:       Meeting discharge criteria.  Labs stable.  VS stable. No  n/v.  Voiding.  Pain well controled.     Trochar sites intact. Minimal vaginal discharge.                Significant Results:     None             Pending Results:     None           Discharge Instructions and Follow-Up:     Discharge diet: Regular   Discharge activity: No lifting, driving, or strenuous exercise for 4 week(s)  No driving or operating machinery while on narcotic analgesics  Pelvic rest: abstain from intercourse and do not use tampons for 4 week(s)   Discharge follow-up: Follow up with Dr Brenner in 4 weeks   Wound care: None   Other instructions: None

## 2023-09-21 NOTE — PROGRESS NOTES
Patient discharged at 10:15 AM via ambulation accompanied by spouse and staff. Prescriptions sent to patients preferred pharmacy. All belongings sent with patient.     Discharge instructions reviewed with patient and spouse. Listed belongings gathered and returned to patient.     Patient discharged to home.     Core Measures and Vaccines  Core Measures applicable during stay: NA  Pneumonia and Influenza given prior to discharge, if indicated: N/A    Surgical Patient   Surgical Procedures during stay: Lap hysterectomy  Did patient receive discharge instruction on wound care and recognition of infection symptoms? Yes    MISC  Follow up appointment made:  Yes  Home and hospital aquired medications returned to patient: N/A  Patient reports pain was well managed at discharge: Yes

## 2023-09-25 LAB
PATH REPORT.COMMENTS IMP SPEC: NORMAL
PATH REPORT.FINAL DX SPEC: NORMAL
PATH REPORT.GROSS SPEC: NORMAL
PATH REPORT.MICROSCOPIC SPEC OTHER STN: NORMAL
PATH REPORT.RELEVANT HX SPEC: NORMAL
PHOTO IMAGE: NORMAL

## 2023-09-25 PROCEDURE — 88307 TISSUE EXAM BY PATHOLOGIST: CPT | Mod: 26 | Performed by: PATHOLOGY

## 2023-09-28 ENCOUNTER — OFFICE VISIT (OUTPATIENT)
Dept: OBGYN | Facility: OTHER | Age: 28
End: 2023-09-28
Attending: OBSTETRICS & GYNECOLOGY
Payer: COMMERCIAL

## 2023-09-28 VITALS
HEIGHT: 65 IN | WEIGHT: 173 LBS | HEART RATE: 99 BPM | SYSTOLIC BLOOD PRESSURE: 104 MMHG | OXYGEN SATURATION: 99 % | BODY MASS INDEX: 28.82 KG/M2 | TEMPERATURE: 99.5 F | DIASTOLIC BLOOD PRESSURE: 60 MMHG

## 2023-09-28 DIAGNOSIS — Z98.890 POST-OPERATIVE STATE: ICD-10-CM

## 2023-09-28 DIAGNOSIS — R21 RASH AND NONSPECIFIC SKIN ERUPTION: Primary | ICD-10-CM

## 2023-09-28 PROCEDURE — 99024 POSTOP FOLLOW-UP VISIT: CPT | Performed by: OBSTETRICS & GYNECOLOGY

## 2023-09-28 PROCEDURE — G0463 HOSPITAL OUTPT CLINIC VISIT: HCPCS

## 2023-09-28 RX ORDER — PREDNISONE 20 MG/1
40 TABLET ORAL DAILY
Qty: 10 TABLET | Refills: 0 | Status: SHIPPED | OUTPATIENT
Start: 2023-09-28 | End: 2023-10-03

## 2023-09-28 ASSESSMENT — PAIN SCALES - GENERAL: PAINLEVEL: MILD PAIN (2)

## 2023-09-28 NOTE — PROGRESS NOTES
"ELOY Harrington is a 28 year old female presents for post operative check. She is  1  week(s) status post LAVH.  She reports doing well and denies significant pain or bleeding.  Bowel and bladder function is satisfactory.   C/o insisional redness.  Also belly rash and itching.       O.  Blood pressure 104/60, pulse 99, temperature 99.5  F (37.5  C), temperature source Tympanic, height 1.651 m (5' 5\"), weight 78.5 kg (173 lb), last menstrual period 08/31/2023, SpO2 99 %.    Abd: soft, non-tender, non-distended. Incision clear, dry, and intact without evidence of infection.  There is an atopic rash/eruption involving Torso and incisions.      A. /P. Atopic skin eruption/rash.  ? From surgical prep due to distributions.    Prednisone short term Rx as I don't want her putting topical steroids on incisions . Wound care/precautions discussed.  F/up 2 weeks for PO check or sooner prn if problems.       Yevgeniy Brenner MD   "

## 2023-10-12 ENCOUNTER — HOSPITAL ENCOUNTER (EMERGENCY)
Facility: HOSPITAL | Age: 28
Discharge: HOME | End: 2023-10-13
Attending: INTERNAL MEDICINE | Admitting: INTERNAL MEDICINE
Payer: COMMERCIAL

## 2023-10-12 ENCOUNTER — APPOINTMENT (OUTPATIENT)
Dept: CT IMAGING | Facility: HOSPITAL | Age: 28
End: 2023-10-12
Attending: INTERNAL MEDICINE
Payer: COMMERCIAL

## 2023-10-12 DIAGNOSIS — R39.9 URINARY SYMPTOM OR SIGN: ICD-10-CM

## 2023-10-12 DIAGNOSIS — R10.2 PELVIC PAIN: ICD-10-CM

## 2023-10-12 LAB
ALBUMIN UR-MCNC: NEGATIVE MG/DL
ANION GAP SERPL CALCULATED.3IONS-SCNC: 11 MMOL/L (ref 7–15)
APPEARANCE UR: CLEAR
BASO+EOS+MONOS # BLD AUTO: ABNORMAL 10*3/UL
BASO+EOS+MONOS NFR BLD AUTO: ABNORMAL %
BASOPHILS # BLD AUTO: 0.1 10E3/UL (ref 0–0.2)
BASOPHILS NFR BLD AUTO: 1 %
BILIRUB UR QL STRIP: NEGATIVE
BUN SERPL-MCNC: 16.1 MG/DL (ref 6–20)
CALCIUM SERPL-MCNC: 9.6 MG/DL (ref 8.6–10)
CHLORIDE SERPL-SCNC: 104 MMOL/L (ref 98–107)
COLOR UR AUTO: ABNORMAL
CREAT SERPL-MCNC: 0.6 MG/DL (ref 0.51–0.95)
DEPRECATED HCO3 PLAS-SCNC: 23 MMOL/L (ref 22–29)
EGFRCR SERPLBLD CKD-EPI 2021: >90 ML/MIN/1.73M2
EOSINOPHIL # BLD AUTO: 0.2 10E3/UL (ref 0–0.7)
EOSINOPHIL NFR BLD AUTO: 2 %
ERYTHROCYTE [DISTWIDTH] IN BLOOD BY AUTOMATED COUNT: 13.8 % (ref 10–15)
GLUCOSE SERPL-MCNC: 93 MG/DL (ref 70–99)
GLUCOSE UR STRIP-MCNC: NEGATIVE MG/DL
HCT VFR BLD AUTO: 39.4 % (ref 35–47)
HGB BLD-MCNC: 12.9 G/DL (ref 11.7–15.7)
HGB UR QL STRIP: ABNORMAL
IMM GRANULOCYTES # BLD: 0 10E3/UL
IMM GRANULOCYTES NFR BLD: 0 %
KETONES UR STRIP-MCNC: NEGATIVE MG/DL
LEUKOCYTE ESTERASE UR QL STRIP: ABNORMAL
LYMPHOCYTES # BLD AUTO: 2.9 10E3/UL (ref 0.8–5.3)
LYMPHOCYTES NFR BLD AUTO: 26 %
MCH RBC QN AUTO: 28 PG (ref 26.5–33)
MCHC RBC AUTO-ENTMCNC: 32.7 G/DL (ref 31.5–36.5)
MCV RBC AUTO: 86 FL (ref 78–100)
MONOCYTES # BLD AUTO: 1 10E3/UL (ref 0–1.3)
MONOCYTES NFR BLD AUTO: 9 %
MUCOUS THREADS #/AREA URNS LPF: PRESENT /LPF
NEUTROPHILS # BLD AUTO: 7.2 10E3/UL (ref 1.6–8.3)
NEUTROPHILS NFR BLD AUTO: 62 %
NITRATE UR QL: NEGATIVE
NRBC # BLD AUTO: 0 10E3/UL
NRBC BLD AUTO-RTO: 0 /100
PH UR STRIP: 5 [PH] (ref 4.7–8)
PLATELET # BLD AUTO: 255 10E3/UL (ref 150–450)
POTASSIUM SERPL-SCNC: 3.8 MMOL/L (ref 3.4–5.3)
RBC # BLD AUTO: 4.61 10E6/UL (ref 3.8–5.2)
RBC URINE: 0 /HPF
SODIUM SERPL-SCNC: 138 MMOL/L (ref 135–145)
SP GR UR STRIP: 1.02 (ref 1–1.03)
SQUAMOUS EPITHELIAL: 1 /HPF
UROBILINOGEN UR STRIP-MCNC: NORMAL MG/DL
WBC # BLD AUTO: 11.4 10E3/UL (ref 4–11)
WBC URINE: 5 /HPF

## 2023-10-12 PROCEDURE — 87086 URINE CULTURE/COLONY COUNT: CPT | Performed by: INTERNAL MEDICINE

## 2023-10-12 PROCEDURE — 99285 EMERGENCY DEPT VISIT HI MDM: CPT | Mod: 25

## 2023-10-12 PROCEDURE — 99284 EMERGENCY DEPT VISIT MOD MDM: CPT | Performed by: INTERNAL MEDICINE

## 2023-10-12 PROCEDURE — 85025 COMPLETE CBC W/AUTO DIFF WBC: CPT | Performed by: INTERNAL MEDICINE

## 2023-10-12 PROCEDURE — 250N000011 HC RX IP 250 OP 636: Performed by: INTERNAL MEDICINE

## 2023-10-12 PROCEDURE — 74177 CT ABD & PELVIS W/CONTRAST: CPT

## 2023-10-12 PROCEDURE — 80048 BASIC METABOLIC PNL TOTAL CA: CPT | Performed by: INTERNAL MEDICINE

## 2023-10-12 PROCEDURE — 36415 COLL VENOUS BLD VENIPUNCTURE: CPT | Performed by: INTERNAL MEDICINE

## 2023-10-12 PROCEDURE — 81001 URINALYSIS AUTO W/SCOPE: CPT | Performed by: INTERNAL MEDICINE

## 2023-10-12 RX ORDER — IOPAMIDOL 755 MG/ML
85 INJECTION, SOLUTION INTRAVASCULAR ONCE
Status: COMPLETED | OUTPATIENT
Start: 2023-10-12 | End: 2023-10-12

## 2023-10-12 RX ORDER — KETOROLAC TROMETHAMINE 30 MG/ML
30 INJECTION, SOLUTION INTRAMUSCULAR; INTRAVENOUS ONCE
Status: COMPLETED | OUTPATIENT
Start: 2023-10-12 | End: 2023-10-12

## 2023-10-12 RX ADMIN — IOPAMIDOL 85 ML: 755 INJECTION, SOLUTION INTRAVENOUS at 23:04

## 2023-10-12 ASSESSMENT — ACTIVITIES OF DAILY LIVING (ADL): ADLS_ACUITY_SCORE: 35

## 2023-10-13 VITALS
SYSTOLIC BLOOD PRESSURE: 133 MMHG | HEART RATE: 105 BPM | BODY MASS INDEX: 29.02 KG/M2 | DIASTOLIC BLOOD PRESSURE: 81 MMHG | TEMPERATURE: 98.4 F | WEIGHT: 174.38 LBS | OXYGEN SATURATION: 96 % | RESPIRATION RATE: 16 BRPM

## 2023-10-13 PROCEDURE — 250N000013 HC RX MED GY IP 250 OP 250 PS 637: Performed by: INTERNAL MEDICINE

## 2023-10-13 RX ORDER — CEPHALEXIN 500 MG/1
500 CAPSULE ORAL 3 TIMES DAILY
Qty: 15 CAPSULE | Refills: 0 | Status: SHIPPED | OUTPATIENT
Start: 2023-10-13 | End: 2023-10-18

## 2023-10-13 RX ORDER — CEPHALEXIN 500 MG/1
500 CAPSULE ORAL ONCE
Status: COMPLETED | OUTPATIENT
Start: 2023-10-13 | End: 2023-10-13

## 2023-10-13 RX ADMIN — CEPHALEXIN 500 MG: 500 CAPSULE ORAL at 00:25

## 2023-10-13 ASSESSMENT — ENCOUNTER SYMPTOMS
BLOOD IN STOOL: 0
VOICE CHANGE: 0
ABDOMINAL DISTENTION: 0
ABDOMINAL PAIN: 1
CHILLS: 0
WHEEZING: 0
SHORTNESS OF BREATH: 0
LIGHT-HEADEDNESS: 0
WOUND: 0
DYSURIA: 1
NECK STIFFNESS: 0
ARTHRALGIAS: 0
VOMITING: 0
CHEST TIGHTNESS: 0
MYALGIAS: 0
HEMATURIA: 0
PALPITATIONS: 0
COUGH: 0
HEADACHES: 0
BACK PAIN: 0
COLOR CHANGE: 0
NECK PAIN: 0
NAUSEA: 0
CONFUSION: 0
ANAL BLEEDING: 0
DIAPHORESIS: 0
DIZZINESS: 0
NUMBNESS: 0
FLANK PAIN: 0
FEVER: 0
FREQUENCY: 1

## 2023-10-13 NOTE — ED NOTES
Pt reports urinary frequency and bladder pressure and pain across the pelvis when pushing to urinate. Pt reports s/p partial hysterectomy on 9/20. Pt reports she frequently has bowel issues and constipation but states she had a BM this morning and this does not feel like constipation. Pt eating and drinking normally. Pt is not currently taking any daily medications. Urine obtained and sent to lab.

## 2023-10-13 NOTE — DISCHARGE INSTRUCTIONS
What to expect when you have contrast    During your exam, we will inject  contrast  into your vein or artery. (Contrast is a clear liquid with iodine in it. It shows up on X-rays.)    You may feel warm or hot. You may have a metal taste in your mouth and a slight upset stomach. You may also feel pressure near the kidneys and bladder. These effects will last about 1 to 3 minutes.    Please tell us if you have:   Sneezing    Itching   Hives    Swelling in the face   A hoarse voice   Breathing problems   Other new symptoms    Serious problems are rare.  They may include:   Irregular heartbeat    Seizures   Kidney failure             Tissue damage   Shock     Death    If you have any problems during the exam, we  will treat them right away.    When you get home    Call your hospital if you have any new symptoms in the next 2 days, like hives or swelling. (Phone numbers are at the bottom of this page.) Or call your family doctor.     If you have wheezing or trouble breathing, call 911.    Self-care  -Drink at least 4 extra glasses of water today.   This reduces the stress on your kidneys.  -Keep taking your regular medicines.    The contrast will pass out of your body in your  Urine(pee). This will happen in the next 24 hours. You  will not feel this. Your urine will not  change color.    If you have kidney problems or take metformin    Drink 4 to 8 large glasses of water for the next  2 days, if you are not on a fluid restriction.    ?If you take metformin (Glucophage or Glucovance) for diabetes, keep taking it.      ?Your kidney function tests are abnormal.  If you take Metformin, do not take it for 48 hours. Please go to your clinic for a blood test within 3 days after your exam before the restarting this medicine.     (Note to provider:please give patient prescription for lab tests.)        I have read and understand the above information.    Patient Sign  Here:______________________________________Date:________Time:______    Staff Sign Here:________________________________________Date:_______Time:______      Radiology Departments:     ?[facility]: [phone] ?[place]: [phone]     ?[place]: [phone] ?[place]:[phone]      ?[place]: [phone]  ?[place]: [phone]  ?[place]:[phone]    ?[facility]:[phone]  ?[facility]:[phone]

## 2023-10-13 NOTE — ED TRIAGE NOTES
Patient presents to the emergency room with complaints of lower abdominal pain and urinary frequency. S/p hysterectomy on 9/20. C/o urinary frequency x 1 week. Today developed lower abdominal pain. Describes the pain as a constant dull pain with intermittent sharp pains. Denies hematuria. Denies fever or chills.

## 2023-10-13 NOTE — ED PROVIDER NOTES
History     Chief Complaint   Patient presents with    Abdominal Pain    Rule out Urinary Tract Infection       Abdominal Pain  Pain location:  Suprapubic, LLQ and RLQ  Pain quality: burning and sharp    Pain radiates to:  Does not radiate  Onset quality:  Gradual  Duration:  12 hours  Timing:  Constant  Progression:  Worsening  Chronicity:  New  Associated symptoms: dysuria    Associated symptoms: no chest pain, no chills, no cough, no fever, no hematuria, no nausea, no shortness of breath, no vaginal bleeding, no vaginal discharge and no vomiting          Allergies:  Allergies   Allergen Reactions    Codeine Hives, Itching and Nausea    Latex Itching, Rash and Hives    Morphine Hives and Nausea and Vomiting    Sulfamethoxazole-Trimethoprim Hives, Nausea and Vomiting and Rash       Problem List:    Patient Active Problem List    Diagnosis Date Noted    Surgery, elective 09/20/2023     Priority: Medium    Depression 06/16/2023     Priority: Medium     History of lexapro       TMJ (temporomandibular joint syndrome) 05/12/2023     Priority: Medium    Menorrhagia with regular cycle 05/05/2023     Priority: Medium    Irritable bowel syndrome with constipation 03/01/2022     Priority: Medium    Intractable migraine with aura without status migrainosus 07/09/2019     Priority: Medium    Endometriosis 03/26/2019     Priority: Medium     Laparoscopy at [facility]       Oral herpes 10/25/2018     Priority: Medium     HSV 1. Oral HSV          Past Medical History:    Past Medical History:   Diagnosis Date    Endometriosis     Endometriosis     Migraine     Postpartum depression 02/11/2019    Tinea versicolor        Past Surgical History:    Past Surgical History:   Procedure Laterality Date    LAPAROSCOPIC ASSISTED HYSTERECTOMY VAGINAL N/A 9/20/2023    Procedure: HYSTERECTOMY, VAGINAL, LAPAROSCOPY-ASSISTED, Lysis of adhesions, fulguration of endometriosis;  Surgeon: Yevgeniy Brenner MD;  Location: [facility]  OR    LAPAROSCOPY      TONSILLECTOMY      TUBAL LIGATION  2019       Family History:    Family History   Problem Relation Age of Onset    Hypertension Father     Hyperlipidemia Father     Crohn's Disease Brother     Heart Disease Maternal Grandmother         leaky valve    Aneurysm Maternal Grandmother     Crohn's Disease Cousin     Cancer Paternal Uncle         throat cancer    Hyperlipidemia Son        Social History:  Marital Status:   [2]  Social History     Tobacco Use    Smoking status: Never    Smokeless tobacco: Never   Vaping Use    Vaping Use: Never used   Substance Use Topics    Alcohol use: Yes     Comment: rare    Drug use: Never        Medications:    cephALEXin (KEFLEX) 500 MG capsule  acetaminophen (TYLENOL) 325 MG tablet  diclofenac (VOLTAREN) 1 % topical gel  HYDROmorphone (DILAUDID) 4 MG tablet  ibuprofen (ADVIL/MOTRIN) 800 MG tablet  magnesium hydroxide (MILK OF MAGNESIA) 400 MG/5ML suspension  naproxen (NAPROSYN) 500 MG tablet  triamcinolone (KENALOG) 0.1 % external cream          Review of Systems   Constitutional:  Negative for chills, diaphoresis and fever.   HENT:  Negative for voice change.    Eyes:  Negative for visual disturbance.   Respiratory:  Negative for cough, chest tightness, shortness of breath and wheezing.    Cardiovascular:  Negative for chest pain, palpitations and leg swelling.   Gastrointestinal:  Positive for abdominal pain. Negative for abdominal distention, anal bleeding, blood in stool, nausea and vomiting.   Genitourinary:  Positive for dysuria, frequency, pelvic pain and urgency. Negative for decreased urine volume, flank pain, hematuria, vaginal bleeding, vaginal discharge and vaginal pain.   Musculoskeletal:  Negative for arthralgias, back pain, gait problem, myalgias, neck pain and neck stiffness.   Skin:  Negative for color change, pallor, rash and wound.   Neurological:  Negative for dizziness, syncope, light-headedness, numbness and headaches.    Psychiatric/Behavioral:  Negative for confusion and suicidal ideas.        Physical Exam   BP: 115/76  Pulse: 84  Temp: 98.4  F (36.9  C)  Resp: 16  Weight: 79.1 kg (174 lb 6.1 oz)  SpO2: 99 %      Physical Exam  Vitals and nursing note reviewed.   Constitutional:       Appearance: She is well-developed.   HENT:      Head: Normocephalic and atraumatic.      Mouth/Throat:      Pharynx: No oropharyngeal exudate.   Eyes:      Conjunctiva/sclera: Conjunctivae normal.      Pupils: Pupils are equal, round, and reactive to light.   Neck:      Thyroid: No thyromegaly.      Vascular: No JVD.      Trachea: No tracheal deviation.   Cardiovascular:      Rate and Rhythm: Normal rate and regular rhythm.      Heart sounds: Normal heart sounds. No murmur heard.     No friction rub. No gallop.   Pulmonary:      Effort: Pulmonary effort is normal. No respiratory distress.      Breath sounds: Normal breath sounds. No stridor. No wheezing or rales.   Chest:      Chest wall: No tenderness.   Abdominal:      General: Bowel sounds are normal. There is no distension.      Palpations: Abdomen is soft. There is no mass.      Tenderness: There is abdominal tenderness in the right lower quadrant, suprapubic area and left lower quadrant. There is no guarding or rebound.   Musculoskeletal:         General: No tenderness. Normal range of motion.      Cervical back: Normal range of motion and neck supple.   Lymphadenopathy:      Cervical: No cervical adenopathy.   Skin:     General: Skin is warm and dry.      Coloration: Skin is not pale.      Findings: No erythema or rash.   Neurological:      Mental Status: She is alert and oriented to person, place, and time.   Psychiatric:         Behavior: Behavior normal.         ED Course                 Procedures              Results for orders placed or performed during the hospital encounter of 10/12/23 (from the past 24 hour(s))   UA with Microscopic reflex to Culture    Specimen: Urine, Midstream    Result Value Ref [place]    Color Urine Light Yellow Colorless, Straw, Light Yellow, Yellow    Appearance Urine Clear Clear    Glucose Urine Negative Negative mg/dL    Bilirubin Urine Negative Negative    Ketones Urine Negative Negative mg/dL    Specific Gravity Urine 1.021 1.003 - 1.035    Blood Urine Trace (A) Negative    pH Urine 5.0 4.7 - 8.0    Protein Albumin Urine Negative Negative mg/dL    Urobilinogen Urine Normal Normal, 2.0 mg/dL    Nitrite Urine Negative Negative    Leukocyte Esterase Urine Moderate (A) Negative    Mucus Urine Present (A) None Seen /LPF    RBC Urine 0 <=2 /HPF    WBC Urine 5 <=5 /HPF    Squamous Epithelials Urine 1 <=1 /HPF    Narrative    Urine Culture ordered based on laboratory criteria   Basic metabolic panel   Result Value Ref [place]    Sodium 138 135 - 145 mmol/L    Potassium 3.8 3.4 - 5.3 mmol/L    Chloride 104 98 - 107 mmol/L    Carbon Dioxide (CO2) 23 22 - 29 mmol/L    Anion Gap 11 7 - 15 mmol/L    Urea Nitrogen 16.1 6.0 - 20.0 mg/dL    Creatinine 0.60 0.51 - 0.95 mg/dL    GFR Estimate >90 >60 mL/min/1.73m2    Calcium 9.6 8.6 - 10.0 mg/dL    Glucose 93 70 - 99 mg/dL   CBC with Platelets & Differential    Narrative    The following orders were created for panel order CBC with Platelets & Differential.  Procedure                               Abnormality         Status                     ---------                               -----------         ------                     CBC with platelets and d...[[id number]]  Abnormal            Final result                 Please view results for these tests on the individual orders.   CBC with platelets and differential   Result Value Ref [place]    WBC Count 11.4 (H) 4.0 - 11.0 10e3/uL    RBC Count 4.61 3.80 - 5.20 10e6/uL    Hemoglobin 12.9 11.7 - 15.7 g/dL    Hematocrit 39.4 35.0 - 47.0 %    MCV 86 78 - 100 fL    MCH 28.0 26.5 - 33.0 pg    MCHC 32.7 31.5 - 36.5 g/dL    RDW 13.8 10.0 - 15.0 %    Platelet Count 255 150 - 450 10e3/uL    %  Neutrophils 62 %    % Lymphocytes 26 %    % Monocytes 9 %    Mids % (Monos, Eos, Basos)      % Eosinophils 2 %    % Basophils 1 %    % Immature Granulocytes 0 %    NRBCs per 100 WBC 0 <1 /100    Absolute Neutrophils 7.2 1.6 - 8.3 10e3/uL    Absolute Lymphocytes 2.9 0.8 - 5.3 10e3/uL    Absolute Monocytes 1.0 0.0 - 1.3 10e3/uL    Mids Abs (Monos, Eos, Basos)      Absolute Eosinophils 0.2 0.0 - 0.7 10e3/uL    Absolute Basophils 0.1 0.0 - 0.2 10e3/uL    Absolute Immature Granulocytes 0.0 <=0.4 10e3/uL    Absolute NRBCs 0.0 10e3/uL       Medications   cephALEXin (KEFLEX) capsule 500 mg (has no administration in time range)   ketorolac (TORADOL) injection 30 mg (30 mg Intravenous Not Given 10/12/23 2218)   sodium chloride (PF) 0.9% PF flush 60 mL (60 mLs Intravenous $Given 10/12/23 2304)   iopamidol (ISOVUE-370) solution 85 mL (85 mLs Intravenous $Given 10/12/23 2304)       Assessments & Plan (with Medical Decision Making)   Pelvic pain, urinary symptoms   Recent partial hysterctomy      Labs reviewed  CT abd pelvis: vrad did not report any acute finding in pelvis  Symptoms improved  Keflex startd  Follow-up with PCP  I have reviewed the nursing notes.    I have reviewed the findings, diagnosis, plan and need for follow up with the patient.          New Prescriptions    CEPHALEXIN (KEFLEX) 500 MG CAPSULE    Take 1 capsule (500 mg) by mouth 3 times daily for 5 days       Final diagnoses:   Pelvic pain   Urinary symptom or sign       10/12/2023   [facility]       Tj Morejon MD  10/13/23 0026

## 2023-10-13 NOTE — ED NOTES
Face to face report given with opportunity to observe patient.    Report given to RICHAR You RN   10/12/2023  10:51 PM

## 2023-10-14 LAB — BACTERIA UR CULT: NORMAL

## 2023-10-19 ENCOUNTER — OFFICE VISIT (OUTPATIENT)
Dept: OBGYN | Facility: OTHER | Age: 28
End: 2023-10-19
Attending: OBSTETRICS & GYNECOLOGY
Payer: COMMERCIAL

## 2023-10-19 VITALS
OXYGEN SATURATION: 99 % | WEIGHT: 174 LBS | DIASTOLIC BLOOD PRESSURE: 68 MMHG | BODY MASS INDEX: 28.99 KG/M2 | HEIGHT: 65 IN | HEART RATE: 89 BPM | SYSTOLIC BLOOD PRESSURE: 100 MMHG | TEMPERATURE: 98.3 F

## 2023-10-19 DIAGNOSIS — Z98.890 POST-OPERATIVE STATE: Primary | ICD-10-CM

## 2023-10-19 PROCEDURE — G0463 HOSPITAL OUTPT CLINIC VISIT: HCPCS | Mod: 25

## 2023-10-19 PROCEDURE — 99024 POSTOP FOLLOW-UP VISIT: CPT | Performed by: OBSTETRICS & GYNECOLOGY

## 2023-10-19 ASSESSMENT — PAIN SCALES - GENERAL: PAINLEVEL: NO PAIN (0)

## 2023-10-19 NOTE — PROGRESS NOTES
"ELOY Harrington is a 28 year old female presents for post operative check. She is  4  week(s) status post LAVH.  She reports doing well and denies significant pain or bleeding.  Bowel and bladder function is satisfactory. Denies incisional problems.     O.  Blood pressure 100/68, pulse 89, temperature 98.3  F (36.8  C), temperature source Tympanic, height 1.651 m (5' 5\"), weight 78.9 kg (174 lb), last menstrual period 08/31/2023, SpO2 99%.    Abd: soft, non-tender, non-distended. Incisions clear, dry, and intact without evidence of infection.  Vagina well supported.      A. /P. Satisfactory post-op check.Continue pelvic rest until 6 wks PO, otherwise released from restrictions.    F/u prn problems or at next annual examination.    Yevgeniy Brenner MD   "

## 2023-11-13 ENCOUNTER — OFFICE VISIT (OUTPATIENT)
Dept: FAMILY MEDICINE | Facility: OTHER | Age: 28
End: 2023-11-13
Attending: NURSE PRACTITIONER
Payer: COMMERCIAL

## 2023-11-13 VITALS
DIASTOLIC BLOOD PRESSURE: 70 MMHG | OXYGEN SATURATION: 100 % | HEART RATE: 112 BPM | BODY MASS INDEX: 28.99 KG/M2 | WEIGHT: 174 LBS | TEMPERATURE: 99 F | HEIGHT: 65 IN | SYSTOLIC BLOOD PRESSURE: 102 MMHG

## 2023-11-13 DIAGNOSIS — S61.211A LACERATION OF LEFT INDEX FINGER WITHOUT FOREIGN BODY WITHOUT DAMAGE TO NAIL, INITIAL ENCOUNTER: Primary | ICD-10-CM

## 2023-11-13 PROCEDURE — 99213 OFFICE O/P EST LOW 20 MIN: CPT | Performed by: NURSE PRACTITIONER

## 2023-11-13 PROCEDURE — 90471 IMMUNIZATION ADMIN: CPT

## 2023-11-13 PROCEDURE — G0463 HOSPITAL OUTPT CLINIC VISIT: HCPCS | Mod: 25

## 2023-11-13 PROCEDURE — G0463 HOSPITAL OUTPT CLINIC VISIT: HCPCS

## 2023-11-13 RX ORDER — BACITRACIN ZINC 500 [USP'U]/G
OINTMENT TOPICAL 2 TIMES DAILY
Qty: 113 G | Refills: 0 | Status: SHIPPED | OUTPATIENT
Start: 2023-11-13 | End: 2024-02-18

## 2023-11-13 ASSESSMENT — PAIN SCALES - GENERAL: PAINLEVEL: NO PAIN (0)

## 2023-11-13 NOTE — PROGRESS NOTES
"  Assessment & Plan     Laceration of left index finger without foreign body without damage to nail, initial encounter  No signs of infection. Appears to be healing nicely. No pain. No surrounding erythema. Brisk capillary refill. I therefore do not feel she needs oral antibiotics. Will continue with bacitracin.     Will also update her Tdap.       She will return with new or worsening symptoms.     - bacitracin 500 UNIT/GM external ointment; Apply topically 2 times daily    56}     BMI:   Estimated body mass index is 28.96 kg/m  as calculated from the following:    Height as of this encounter: 1.651 m (5' 5\").    Weight as of this encounter: 78.9 kg (174 lb).           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         Review of Systems   Constitutional, HEENT, cardiovascular, pulmonary, gi and gu systems are negative, except as otherwise noted.      Objective    /70 (BP Location: Left arm, Patient Position: Sitting, Cuff Size: Adult Regular)   Pulse 112   Temp 99  F (37.2  C) (Tympanic)   Ht 1.651 m (5' 5\")   Wt 78.9 kg (174 lb)   LMP 08/31/2023 (Approximate)   SpO2 100%   BMI 28.96 kg/m    Body mass index is 28.96 kg/m .  Physical Exam   GENERAL: healthy, alert and no distress  NEURO: Normal strength and tone, mentation intact and speech normal  PSYCH: mentation appears normal, " affect normal/bright  LEFT Index Finger: patient with 0.5 cm laceration medial side left index finger nail, no surrounding erythema, nail bed is not disturbed, no drainage, brisk capillary refill, left radial pulse 2+, able to flex and extend finger at all joints

## 2023-11-13 NOTE — COMMUNITY RESOURCES LIST (ENGLISH)
11/13/2023   [facility]  N/A  For questions about this resource list or additional care needs, please contact your primary care clinic or care manager.  Phone: [phone]   Email: N/A   Address: [address] [place] [id number]   Hours: N/A        Housing       Shelter for families  1  [facility] Distance: 17.2 miles      In-Person   [address] [address]  Language: English  Hours: Mon - Sun Open 24 Hours  Fees: Free   Phone: [phone]) [phone] Email: info@[employer] Standard Website: https://www.[employer]/     Shelter for individuals  2  [facility] Distance: 17.2 miles      In-Person   [address] [address]  Language: English  Hours: Mon - Sun Open 24 Hours  Fees: Free   Phone: [phone]) [phone] Email: info@[employer] Website: https://www.[employer].org/          Important Numbers & Websites       Emergency Services   911  [facility] Services   311  Poison Control   (424) 656-9566  Suicide Prevention Lifeline   (869) 557-3485 (TALK)  Child Abuse Hotline   (614) 171-2854 (4-A-Child)  Sexual Assault Hotline   (995) 141-4113 (HOPE)  National Runaway Safeline   (735) 386-4734 (RUNAWAY)  All-Options Talkline   (965) 877-7422  Substance Abuse Referral   (679) 686-2400 (HELP)

## 2024-01-16 ENCOUNTER — HOSPITAL ENCOUNTER (EMERGENCY)
Facility: HOSPITAL | Age: 29
Discharge: HOME | End: 2024-01-16
Payer: COMMERCIAL

## 2024-01-16 VITALS
DIASTOLIC BLOOD PRESSURE: 63 MMHG | OXYGEN SATURATION: 98 % | RESPIRATION RATE: 18 BRPM | SYSTOLIC BLOOD PRESSURE: 115 MMHG | HEART RATE: 98 BPM | TEMPERATURE: 98.5 F

## 2024-01-16 DIAGNOSIS — K08.89 PAIN, DENTAL: ICD-10-CM

## 2024-01-16 DIAGNOSIS — L01.00 IMPETIGO: ICD-10-CM

## 2024-01-16 PROCEDURE — 99213 OFFICE O/P EST LOW 20 MIN: CPT

## 2024-01-16 PROCEDURE — G0463 HOSPITAL OUTPT CLINIC VISIT: HCPCS

## 2024-01-16 RX ORDER — MUPIROCIN 20 MG/G
OINTMENT TOPICAL 2 TIMES DAILY
Qty: 30 G | Refills: 0 | Status: SHIPPED | OUTPATIENT
Start: 2024-01-16 | End: 2024-01-23

## 2024-01-16 ASSESSMENT — ENCOUNTER SYMPTOMS
VOMITING: 0
SINUS PAIN: 1
NAUSEA: 0
ABDOMINAL PAIN: 0
APPETITE CHANGE: 0
FEVER: 0
COUGH: 0
ACTIVITY CHANGE: 0
SINUS PRESSURE: 1
SHORTNESS OF BREATH: 0
DIARRHEA: 0

## 2024-01-16 NOTE — ED TRIAGE NOTES
Pt presents with c/o dental pain    States that the top and bottom back pain. Started to hurt Friday    States that she does have congestion, sinus pain that stared Friday also    Tyl and ibu   No otc meds today

## 2024-01-16 NOTE — ED PROVIDER NOTES
History     Chief Complaint   Patient presents with    Dental Pain     HPI  Tasneem Harrington is a 28 year old female who presents to the urgent care with a 5 day history of upper and lower posterior dental pain. She also complains of congestion that started at the same time. She denies fevers, chills, chest pain, shortness of breath, and n/v/d. No recent abx. No OTC medications today.     Allergies:  Allergies   Allergen Reactions    Codeine Hives, Itching and Nausea    Latex Itching, Rash and Hives    Morphine Hives and Nausea and Vomiting    Sulfamethoxazole-Trimethoprim Hives, Nausea and Vomiting and Rash       Problem List:    Patient Active Problem List    Diagnosis Date Noted    Surgery, elective 09/20/2023     Priority: Medium    Depression 06/16/2023     Priority: Medium     History of lexapro       TMJ (temporomandibular joint syndrome) 05/12/2023     Priority: Medium    Menorrhagia with regular cycle 05/05/2023     Priority: Medium    Irritable bowel syndrome with constipation 03/01/2022     Priority: Medium    Intractable migraine with aura without status migrainosus 07/09/2019     Priority: Medium    Endometriosis 03/26/2019     Priority: Medium     Laparoscopy at [facility]       Oral herpes 10/25/2018     Priority: Medium     HSV 1. Oral HSV          Past Medical History:    Past Medical History:   Diagnosis Date    Endometriosis     Endometriosis     Migraine     Postpartum depression 02/11/2019    Tinea versicolor        Past Surgical History:    Past Surgical History:   Procedure Laterality Date    LAPAROSCOPIC ASSISTED HYSTERECTOMY VAGINAL N/A 9/20/2023    Procedure: HYSTERECTOMY, VAGINAL, LAPAROSCOPY-ASSISTED, Lysis of adhesions, fulguration of endometriosis;  Surgeon: Yevgeniy Brenner MD;  Location: [facility] OR    LAPAROSCOPY      TONSILLECTOMY      TUBAL LIGATION  2019       Family History:    Family History   Problem Relation Age of Onset    Hypertension Father      Hyperlipidemia Father     Crohn's Disease Brother     Heart Disease Maternal Grandmother         leaky valve    Aneurysm Maternal Grandmother     Crohn's Disease Cousin     Cancer Paternal Uncle         throat cancer    Hyperlipidemia Son        Social History:  Marital Status:   [2]  Social History     Tobacco Use    Smoking status: Never    Smokeless tobacco: Never   Vaping Use    Vaping Use: Never used   Substance Use Topics    Alcohol use: Yes     Comment: rare    Drug use: Never        Medications:    amoxicillin-clavulanate (AUGMENTIN) 875-125 MG tablet  ibuprofen (ADVIL/MOTRIN) 800 MG tablet  mupirocin (BACTROBAN) 2 % external ointment  bacitracin 500 UNIT/GM external ointment  diclofenac (VOLTAREN) 1 % topical gel          Review of Systems   Constitutional:  Negative for activity change, appetite change and fever.   HENT:  Positive for congestion, dental problem, sinus pressure and sinus pain. Negative for ear pain.    Respiratory:  Negative for cough and shortness of breath.    Gastrointestinal:  Negative for abdominal pain, diarrhea, nausea and vomiting.   All other systems reviewed and are negative.      Physical Exam   BP: 115/63  Pulse: 98  Temp: 98.5  F (36.9  C)  Resp: 18  SpO2: 98 %      Physical Exam  Vitals and nursing note reviewed.   Constitutional:       General: She is not in acute distress.     Appearance: Normal appearance. She is not ill-appearing, toxic-appearing or diaphoretic.   HENT:      Head:      Jaw: No trismus.      Nose: Congestion present. No rhinorrhea.      Mouth/Throat:      Lips: Pink. Lesions present.      Mouth: Mucous membranes are moist.      Dentition: Dental tenderness present. No gingival swelling, dental abscesses or gum lesions.      Pharynx: Oropharynx is clear. No oropharyngeal exudate or posterior oropharyngeal erythema.     Cardiovascular:      Rate and Rhythm: Normal rate and regular rhythm.      Pulses: Normal pulses.      Heart sounds: Normal heart  sounds.   Pulmonary:      Effort: Pulmonary effort is normal. No respiratory distress.      Breath sounds: Normal breath sounds. No stridor. No wheezing, rhonchi or rales.   Neurological:      Mental Status: She is alert.         ED Course                 Procedures           No results found for this or any previous visit (from the past 24 hour(s)).    Medications - No data to display    Assessments & Plan (with Medical Decision Making)     I have reviewed the nursing notes.    I have reviewed the findings, diagnosis, plan and need for follow up with the patient.  Tasneem Harrington is a 28 year old female who presents to the urgent care with a 5 day history of upper and lower posterior dental pain. She also complains of congestion that started at the same time. She denies fevers, chills, chest pain, shortness of breath, and n/v/d. No recent abx. No OTC medications today.     MDM: vital signs normal. Afebrile. Lungs clear, heart tones regular. Tenderness generalized throughout mouth to posterior teeth. No palpable fluctuance, visible abscess, or gum lesions. No facial swelling, trismus, or swallowing difficulty. Since she is congested, concern for possible viral sinusitis. She does have a history of dental carries with fillings. Discussed viral versus bacterial etiologies. Will treat with augmentin. Bactroban ointment mike prescribed as she has a honey colored lesion below left nare. Supportive measures and return precautions discussed. She is in agreement with plan.     (K08.89) Pain, dental, (L01.00) Impetigo  Plan: Antibiotics 2 times daily for 7 days. Yogurt or probiotic while taking. Bactroban ointment 2 times daily to sore below nose.   Push fluids. Tylenol and ibuprofen as needed. Return with any concerns. Follow up in the clinic as needed. Understanding verbalized.        New Prescriptions    AMOXICILLIN-CLAVULANATE (AUGMENTIN) 875-125 MG TABLET    Take 1 tablet by mouth 2 times daily for 7 days    MUPIROCIN  (BACTROBAN) 2 % EXTERNAL OINTMENT    Apply topically 2 times daily for 7 days       Final diagnoses:   Pain, dental   Impetigo       1/16/2024   [facility]       Mckenzie Garrett NP  01/16/24 1223

## 2024-01-22 ENCOUNTER — TELEPHONE (OUTPATIENT)
Dept: PEDIATRICS | Facility: OTHER | Age: 29
End: 2024-01-22

## 2024-01-22 DIAGNOSIS — L01.00 IMPETIGO: Primary | ICD-10-CM

## 2024-01-22 RX ORDER — AMOXICILLIN AND CLAVULANATE POTASSIUM 500; 125 MG/1; MG/1
1 TABLET, FILM COATED ORAL 2 TIMES DAILY
Qty: 20 TABLET | Refills: 0 | Status: SHIPPED | OUTPATIENT
Start: 2024-01-22 | End: 2024-02-01

## 2024-02-01 ENCOUNTER — HOSPITAL ENCOUNTER (EMERGENCY)
Facility: HOSPITAL | Age: 29
Discharge: HOME | End: 2024-02-01
Attending: NURSE PRACTITIONER | Admitting: NURSE PRACTITIONER
Payer: COMMERCIAL

## 2024-02-01 VITALS
BODY MASS INDEX: 28.98 KG/M2 | TEMPERATURE: 98.2 F | SYSTOLIC BLOOD PRESSURE: 117 MMHG | DIASTOLIC BLOOD PRESSURE: 60 MMHG | RESPIRATION RATE: 16 BRPM | HEIGHT: 65 IN | OXYGEN SATURATION: 99 % | WEIGHT: 173.94 LBS | HEART RATE: 77 BPM

## 2024-02-01 DIAGNOSIS — K04.7 DENTAL INFECTION: ICD-10-CM

## 2024-02-01 PROCEDURE — G0463 HOSPITAL OUTPT CLINIC VISIT: HCPCS

## 2024-02-01 PROCEDURE — 99213 OFFICE O/P EST LOW 20 MIN: CPT | Performed by: NURSE PRACTITIONER

## 2024-02-01 RX ORDER — CLINDAMYCIN HCL 150 MG
450 CAPSULE ORAL 3 TIMES DAILY
Qty: 63 CAPSULE | Refills: 0 | Status: SHIPPED | OUTPATIENT
Start: 2024-02-01 | End: 2024-02-08

## 2024-02-01 RX ORDER — HYDROCODONE BITARTRATE AND ACETAMINOPHEN 5; 325 MG/1; MG/1
1 TABLET ORAL EVERY 6 HOURS PRN
Qty: 10 TABLET | Refills: 0 | Status: SHIPPED | OUTPATIENT
Start: 2024-02-01 | End: 2024-02-18

## 2024-02-01 ASSESSMENT — ENCOUNTER SYMPTOMS
ACTIVITY CHANGE: 1
SINUS PRESSURE: 0
NECK PAIN: 0
FACIAL SWELLING: 0
DIZZINESS: 0
SINUS PAIN: 0
FEVER: 0
VOMITING: 0
NECK STIFFNESS: 0
SORE THROAT: 0
LIGHT-HEADEDNESS: 0
NAUSEA: 0
HEADACHES: 1
CHILLS: 0

## 2024-02-01 NOTE — ED PROVIDER NOTES
History     Chief Complaint   Patient presents with    Dental Pain     Bilateral upper dental pain     HPI  Tasneem Harrington is a 28 year old female who has had ongoing bilateral, upper dental pain/infections for the past year.  Worse in the past week.  Accompanied with headache and slight blurry vision at times.  She was treated with an antibiotic for dental infection in January 16th.  She had not completed her full course of antibiotics.  Finished them this past couple days.  Has had wisdom tooth removed.  Took ibuprofen 800 mg 2 hours ago with minimal relief of her discomfort.  Non-smoker.  Denies fevers, chills, nausea, vomiting, dizziness, lightheadedness, and neck pain and stiffness.    Allergies:  Allergies   Allergen Reactions    Codeine Hives, Itching and Nausea    Latex Itching, Rash and Hives    Morphine Hives and Nausea and Vomiting    Sulfamethoxazole-Trimethoprim Hives, Nausea and Vomiting and Rash       Problem List:    Patient Active Problem List    Diagnosis Date Noted    Surgery, elective 09/20/2023     Priority: Medium    Depression 06/16/2023     Priority: Medium     History of lexapro       TMJ (temporomandibular joint syndrome) 05/12/2023     Priority: Medium    Menorrhagia with regular cycle 05/05/2023     Priority: Medium    Irritable bowel syndrome with constipation 03/01/2022     Priority: Medium    Intractable migraine with aura without status migrainosus 07/09/2019     Priority: Medium    Endometriosis 03/26/2019     Priority: Medium     Laparoscopy at [facility]       Oral herpes 10/25/2018     Priority: Medium     HSV 1. Oral HSV          Past Medical History:    Past Medical History:   Diagnosis Date    Endometriosis     Endometriosis     Migraine     Postpartum depression 02/11/2019    Tinea versicolor        Past Surgical History:    Past Surgical History:   Procedure Laterality Date    LAPAROSCOPIC ASSISTED HYSTERECTOMY VAGINAL N/A 9/20/2023    Procedure:  "HYSTERECTOMY, VAGINAL, LAPAROSCOPY-ASSISTED, Lysis of adhesions, fulguration of endometriosis;  Surgeon: Yevgeniy Brenner MD;  Location: [facility]    LAPAROSCOPY      TONSILLECTOMY      TUBAL LIGATION  2019       Family History:    Family History   Problem Relation Age of Onset    Hypertension Father     Hyperlipidemia Father     Crohn's Disease Brother     Heart Disease Maternal Grandmother         leaky valve    Aneurysm Maternal Grandmother     Crohn's Disease Cousin     Cancer Paternal Uncle         throat cancer    Hyperlipidemia Son        Social History:  Marital Status:   [2]  Social History     Tobacco Use    Smoking status: Never    Smokeless tobacco: Never   Vaping Use    Vaping Use: Never used   Substance Use Topics    Alcohol use: Yes     Comment: rare    Drug use: Never        Medications:    bacitracin 500 UNIT/GM external ointment  clindamycin (CLEOCIN) 150 MG capsule  HYDROcodone-acetaminophen (NORCO) 5-325 MG tablet  ibuprofen (ADVIL/MOTRIN) 800 MG tablet  diclofenac (VOLTAREN) 1 % topical gel          Review of Systems   Constitutional:  Positive for activity change. Negative for chills and fever.   HENT:  Positive for dental problem. Negative for ear pain, facial swelling, sinus pressure, sinus pain and sore throat.    Eyes:  Positive for visual disturbance (blurry).   Gastrointestinal:  Negative for nausea and vomiting.   Musculoskeletal:  Negative for neck pain and neck stiffness.   Neurological:  Positive for headaches. Negative for dizziness and light-headedness.       Physical Exam   BP: 117/60  Pulse: 77  Temp: 98.2  F (36.8  C)  Resp: 16  Height: 165.1 cm (5' 5\")  Weight: 78.9 kg (173 lb 15.1 oz)  SpO2: 99 %      Physical Exam  Vitals and nursing note reviewed.   Constitutional:       General: She is in acute distress (moderate).      Appearance: She is overweight.   HENT:      Head: Normocephalic.      Jaw: There is normal jaw occlusion.        Right Ear: Tympanic membrane and ear canal " normal.      Left Ear: Tympanic membrane and ear canal normal.      Nose: Nose normal.      Mouth/Throat:      Lips: Pink.      Mouth: Mucous membranes are moist.      Dentition: Dental tenderness, gingival swelling and dental caries present.     Eyes:      Conjunctiva/sclera: Conjunctivae normal.   Cardiovascular:      Rate and Rhythm: Normal rate.   Pulmonary:      Effort: Pulmonary effort is normal.   Musculoskeletal:      Cervical back: No tenderness.   Lymphadenopathy:      Cervical: No cervical adenopathy.   Skin:     General: Skin is warm and dry.   Neurological:      Mental Status: She is alert and oriented to person, place, and time.   Psychiatric:         Behavior: Behavior normal.         ED Course                 Procedures           No results found for this or any previous visit (from the past 24 hour(s)).    Medications - No data to display    Assessments & Plan (with Medical Decision Making)     I have reviewed the nursing notes.    I have reviewed the findings, diagnosis, plan and need for follow up with the patient.  (K04.7) Dental infection  Comment: 28 year old female who has had ongoing bilateral, upper dental pain/infections for the past year.  Worse in the past week.  Accompanied with headache and slight blurry vision at times.  She was treated with an antibiotic for dental infection in January 16th.  She had not completed her full course of antibiotics.  Finished them this past couple days.  Has had wisdom tooth removed.  Took ibuprofen 800 mg 2 hours ago with minimal relief of her discomfort.  Non-smoker.  Denies fevers, chills, nausea, vomiting, dizziness, lightheadedness, and neck pain and stiffness.    MDM: mild right sided facial swelling. Tooth # 31 has moderate dental caries. Occlusion normal.    Refused ketorolac     Plan: Clindamycin 3 times daily for 7 days.  Hydrocodone for pain.  Education provided and/or discussed for this/these medications and dental infection.  -Increase  fluids.   -Complete all antibiotics even if feeling better.    -Taking antibiotics with food may decrease the symptoms, of an upset stomach, that can occur when taking antibiotics. Antibiotics frequently cause diarrhea. Probiotics or yogurt may help prevent or decrease these symptoms.   -Return to be reevaluated if symptoms do not improve, or worsen.    Ice to affected area 20 minutes every hour as needed for comfort. After 48 hours you can apply heat. Ibuprofen 600 to 800 mg (3 - 4 tabs of over the counter med) every six to eight hours as needed;not to exceed maximum amount of 3200 mg in 24 hours. Take with food. Ty    For severe pain take two hydrocodone every four to six hours as needed. For moderate pain take one hydrocodone with 325 to 650 mg of acetaminophen (Tylenol). For mild pain take Tylenol 650 to 1000 mg every four to six hours as needed (not to exceed more than 4000 mg in a 24 hour period).  BE aware using narcotics can increase your risk of falling so be very careful with ambulation and getting in and out of bed and standing up from chairs.      Apply a cold pack or ice compress for up to 20 minutes several times a day. This is to help reduce pain and relieve swelling. Cover it with a thin, dry cloth before putting it on your skin.    Rinse your mouth with warm saltwater several times per day. This will help reduce irritation, gum swelling, and pain.  Good oral hygiene is imperitive. Brush your at least twice a day. Use a fluoride toothpaste and soft-bristle toothbrush.  Eat a healthy diet that doesn t include many sugary foods and drinks.  Call ASAP to schedule an appointment to see a dentist.   Our  department can try to assist you if you are having difficulty finding a dentist, dental coverage, or paying for dental care.  You can reach them by calling [phone] and asking for .  Return to ED/UC if symptoms increase or concerns develop such as those discussed and listed  "on the \"When to go the Emergency Room\" portion of your discharge instructions.   These discharge instructions and medications were reviewed with her and understanding verbalized.    This document was prepared using a combination of typing and voice generated software.  While every attempt was made for accuracy, spelling and grammatical errors may exist.    Discharge Medication List as of 2/1/2024  5:55 PM        START taking these medications    Details   clindamycin (CLEOCIN) 150 MG capsule Take 3 capsules (450 mg) by mouth 3 times daily for 7 days, Disp-63 capsule, R-0, E-Prescribe      HYDROcodone-acetaminophen (NORCO) 5-325 MG tablet Take 1 tablet by mouth every 6 hours as needed for severe pain or moderate to severe pain, Disp-10 tablet, R-0, E-Prescribe             Final diagnoses:   Dental infection       2/1/2024   [facility]         Denisa Willard, CNP  02/01/24 1814    "

## 2024-02-01 NOTE — DISCHARGE INSTRUCTIONS
clindamycin as prescribed for infection.  -Increase fluids.   -Complete all antibiotics even if feeling better.    -Taking antibiotics with food may decrease the symptoms, of an upset stomach, that can occur when taking antibiotics. Antibiotics frequently cause diarrhea. Probiotics or yogurt may help prevent or decrease these symptoms.   -Return to be reevaluated if symptoms do not improve, or worsen.    Ice to affected area 20 minutes every hour as needed for comfort. After 48 hours you can apply heat. Ibuprofen 600 to 800 mg (3 - 4 tabs of over the counter med) every six to eight hours as needed;not to exceed maximum amount of 3200 mg in 24 hours. Take with food. Ty    For severe pain take two hydrocodone every four to six hours as needed. For moderate pain take one hydrocodone with 325 to 650 mg of acetaminophen (Tylenol). For mild pain take Tylenol 650 to 1000 mg every four to six hours as needed (not to exceed more than 4000 mg in a 24 hour period).  BE aware using narcotics can increase your risk of falling so be very careful with ambulation and getting in and out of bed and standing up from chairs.      Apply a cold pack or ice compress for up to 20 minutes several times a day. This is to help reduce pain and relieve swelling. Cover it with a thin, dry cloth before putting it on your skin.    Rinse your mouth with warm saltwater several times per day. This will help reduce irritation, gum swelling, and pain.  Good oral hygiene is imperitive. Brush your at least twice a day. Use a fluoride toothpaste and soft-bristle toothbrush.  Eat a healthy diet that doesn t include many sugary foods and drinks.  Call ASAP to schedule an appointment to see a dentist.   Our  department can try to assist you if you are having difficulty finding a dentist, dental coverage, or paying for dental care.  You can reach them by calling [phone] and asking for .  Return to ED/UC if symptoms increase or  "concerns develop such as those discussed and listed on the \"When to go the Emergency Room\" portion of your discharge instructions.       "

## 2024-02-01 NOTE — ED TRIAGE NOTES
Pt presents with c/o bilateral upper dental pain. Reports that she tried getting into a dentist but was unable to find someone that takes new people. Pain started this morning. Had the same issue 3 weeks ago. States that she tried putting orajel on but states that it burned her gums. Pt took ibuprofen.

## 2024-02-18 ENCOUNTER — HOSPITAL ENCOUNTER (EMERGENCY)
Facility: HOSPITAL | Age: 29
Discharge: HOME | End: 2024-02-18
Attending: NURSE PRACTITIONER | Admitting: NURSE PRACTITIONER
Payer: COMMERCIAL

## 2024-02-18 VITALS
TEMPERATURE: 98.9 F | WEIGHT: 170 LBS | HEART RATE: 87 BPM | RESPIRATION RATE: 18 BRPM | BODY MASS INDEX: 28.29 KG/M2 | DIASTOLIC BLOOD PRESSURE: 66 MMHG | SYSTOLIC BLOOD PRESSURE: 96 MMHG | OXYGEN SATURATION: 98 %

## 2024-02-18 DIAGNOSIS — R30.9 PAINFUL URINATION: ICD-10-CM

## 2024-02-18 LAB
ALBUMIN UR-MCNC: 20 MG/DL
APPEARANCE UR: ABNORMAL
BACTERIA #/AREA URNS HPF: ABNORMAL /HPF
BACTERIAL VAGINOSIS VAG-IMP: NEGATIVE
BILIRUB UR QL STRIP: NEGATIVE
CANDIDA DNA VAG QL NAA+PROBE: NOT DETECTED
CANDIDA GLABRATA / CANDIDA KRUSEI DNA: NOT DETECTED
COLOR UR AUTO: YELLOW
GLUCOSE UR STRIP-MCNC: NEGATIVE MG/DL
HGB UR QL STRIP: ABNORMAL
KETONES UR STRIP-MCNC: 20 MG/DL
LEUKOCYTE ESTERASE UR QL STRIP: NEGATIVE
MUCOUS THREADS #/AREA URNS LPF: PRESENT /LPF
NITRATE UR QL: NEGATIVE
PH UR STRIP: 5.5 [PH] (ref 4.7–8)
RBC URINE: 5 /HPF
SP GR UR STRIP: 1.03 (ref 1–1.03)
SQUAMOUS EPITHELIAL: 9 /HPF
T VAGINALIS DNA VAG QL NAA+PROBE: NOT DETECTED
UROBILINOGEN UR STRIP-MCNC: NORMAL MG/DL
WBC URINE: 5 /HPF

## 2024-02-18 PROCEDURE — 0352U MULTIPLEX VAGINAL PANEL BY PCR: CPT | Performed by: NURSE PRACTITIONER

## 2024-02-18 PROCEDURE — 99213 OFFICE O/P EST LOW 20 MIN: CPT | Performed by: NURSE PRACTITIONER

## 2024-02-18 PROCEDURE — 81001 URINALYSIS AUTO W/SCOPE: CPT | Performed by: NURSE PRACTITIONER

## 2024-02-18 PROCEDURE — G0463 HOSPITAL OUTPT CLINIC VISIT: HCPCS

## 2024-02-18 ASSESSMENT — ACTIVITIES OF DAILY LIVING (ADL): ADLS_ACUITY_SCORE: 35

## 2024-02-18 ASSESSMENT — ENCOUNTER SYMPTOMS
HEADACHES: 0
BACK PAIN: 0
DYSURIA: 1
DIARRHEA: 0
FEVER: 0
FLANK PAIN: 0
CHILLS: 0
HEMATURIA: 0
VOMITING: 0
DIZZINESS: 0
ACTIVITY CHANGE: 1
NAUSEA: 0
FREQUENCY: 1
ABDOMINAL PAIN: 0
LIGHT-HEADEDNESS: 0

## 2024-02-18 NOTE — ED PROVIDER NOTES
History     Chief Complaint   Patient presents with    Dysuria     HPI  Tasneem Harrington is a 28 year old female who presents with symptoms of painful urination, frequency, pelvic pain, and urgency that started yesterday.  No OTC medications have been taken.    Had hysterectomy September, 2023.  Had UTI and kidney stones were found on CT after that time.  Is not sexually active.  Non-smoker.  Denies fevers, chills, nausea, vomiting, diarrhea, abdominal, back, and flank pain, and headaches.    URINARY TRACT SYMPTOMS    Duration: yesterday  Description  dysuria, frequency, and urgency  Intensity:  moderate  Accompanying signs and symptoms:  Fever/chills: no   Flank pain no   Nausea and vomiting: no   Vaginal symptoms: none  Abdominal/Pelvic Pain: YES- pelvic discomfort  History  History of frequent UTI's: YES- last episode October 2023  History of kidney stones: YES- seen on CT scan 10/2023  Sexually Active: no   Possibility of pregnancy: No  Precipitating or alleviating factors: None  Therapies tried and outcome: none      Allergies:  Allergies   Allergen Reactions    Codeine Hives, Itching and Nausea    Latex Itching, Rash and Hives    Morphine Hives and Nausea and Vomiting    Sulfamethoxazole-Trimethoprim Hives, Nausea and Vomiting and Rash       Problem List:    Patient Active Problem List    Diagnosis Date Noted    Surgery, elective 09/20/2023     Priority: Medium    Depression 06/16/2023     Priority: Medium     History of lexapro       TMJ (temporomandibular joint syndrome) 05/12/2023     Priority: Medium    Menorrhagia with regular cycle 05/05/2023     Priority: Medium    Irritable bowel syndrome with constipation 03/01/2022     Priority: Medium    Intractable migraine with aura without status migrainosus 07/09/2019     Priority: Medium    Endometriosis 03/26/2019     Priority: Medium     Laparoscopy at [facility]       Oral herpes 10/25/2018     Priority: Medium     HSV 1. Oral HSV           Past Medical History:    Past Medical History:   Diagnosis Date    Endometriosis     Endometriosis     Migraine     Postpartum depression 02/11/2019    Tinea versicolor        Past Surgical History:    Past Surgical History:   Procedure Laterality Date    LAPAROSCOPIC ASSISTED HYSTERECTOMY VAGINAL N/A 9/20/2023    Procedure: HYSTERECTOMY, VAGINAL, LAPAROSCOPY-ASSISTED, Lysis of adhesions, fulguration of endometriosis;  Surgeon: Yevgeniy Brenner MD;  Location: [facility]    LAPAROSCOPY      TONSILLECTOMY      TUBAL LIGATION  2019       Family History:    Family History   Problem Relation Age of Onset    Hypertension Father     Hyperlipidemia Father     Crohn's Disease Brother     Heart Disease Maternal Grandmother         leaky valve    Aneurysm Maternal Grandmother     Crohn's Disease Cousin     Cancer Paternal Uncle         throat cancer    Hyperlipidemia Son        Social History:  Marital Status:   [2]  Social History     Tobacco Use    Smoking status: Never    Smokeless tobacco: Never   Vaping Use    Vaping Use: Never used   Substance Use Topics    Alcohol use: Yes     Comment: rare    Drug use: Never        Medications:    diclofenac (VOLTAREN) 1 % topical gel  ibuprofen (ADVIL/MOTRIN) 800 MG tablet          Review of Systems   Constitutional:  Positive for activity change. Negative for chills and fever.   Gastrointestinal:  Negative for abdominal pain, diarrhea, nausea and vomiting.        Had hysterectomy in September 2023 endometriosis   Last BM yesterday normal for her   Genitourinary:  Positive for dysuria, frequency, pelvic pain and urgency. Negative for flank pain, hematuria, vaginal bleeding and vaginal discharge.   Musculoskeletal:  Negative for back pain.   Neurological:  Negative for dizziness, light-headedness and headaches.       Physical Exam   BP: 96/66  Pulse: 87  Temp: 98.9  F (37.2  C)  Resp: 18  Weight: 77.1 kg (170 lb)  SpO2: 98 %      Physical Exam  Vitals and nursing note reviewed.    Constitutional:       General: She is not in acute distress.     Appearance: She is overweight.   Cardiovascular:      Rate and Rhythm: Normal rate and regular rhythm.      Heart sounds: Normal heart sounds. No murmur heard.  Pulmonary:      Effort: Pulmonary effort is normal. No respiratory distress.      Breath sounds: Normal breath sounds. No wheezing or rales.   Abdominal:      General: Abdomen is flat. Bowel sounds are normal. There is no distension.      Palpations: Abdomen is soft. There is no hepatomegaly or splenomegaly.      Tenderness: There is no abdominal tenderness. There is no right CVA tenderness, left CVA tenderness or guarding.   Skin:     General: Skin is warm and dry.   Neurological:      Mental Status: She is alert and oriented to person, place, and time.   Psychiatric:         Behavior: Behavior normal.         ED Course                 Procedures             Results for orders placed or performed during the hospital encounter of 02/18/24 (from the past 24 hour(s))   UA with Microscopic reflex to Culture    Specimen: Urine, Midstream   Result Value Ref Range    Color Urine Yellow Colorless, Straw, Light Yellow, Yellow    Appearance Urine Slightly Cloudy (A) Clear    Glucose Urine Negative Negative mg/dL    Bilirubin Urine Negative Negative    Ketones Urine 20 (A) Negative mg/dL    Specific Gravity Urine 1.031 1.003 - 1.035    Blood Urine Trace (A) Negative    pH Urine 5.5 4.7 - 8.0    Protein Albumin Urine 20 (A) Negative mg/dL    Urobilinogen Urine Normal Normal, 2.0 mg/dL    Nitrite Urine Negative Negative    Leukocyte Esterase Urine Negative Negative    Bacteria Urine Few (A) None Seen /HPF    Mucus Urine Present (A) None Seen /LPF    RBC Urine 5 (H) <=2 /HPF    WBC Urine 5 <=5 /HPF    Squamous Epithelials Urine 9 (H) <=1 /HPF    Narrative    Urine Culture not indicated   Multiplex Vaginal Panel by PCR    Specimen: Vagina; Swab   Result Value Ref Range    Bacterial Vaginosis Organism DNA  Negative Negative    Candida Group DNA Not Detected Not Detected    Candida glabrata / Mai krusei DNA Not Detected Not Detected    Trichomonas vaginalis DNA Not Detected Not Detected    Narrative    The Xpert  Xpress MVP test, performed on the [employer], is an automated, qualitative in vitro diagnostic test for the detection of DNA targets from anaerobic bacteria associated with bacterial vaginosis, Candida species associated with vulvovaginal candidiasis, and Trichomonas vaginalis. The assay uses clinician-collected and self-collected vaginal swabs from patients who are symptomatic for vaginitis/ vaginosis. The Xpert  Xpress MVP test utilizes real-time polymerase chain reaction (PCR) for the amplification of specific DNA targets and utilizes fluorogenic target-specific hybridization probes to detect and differentiate DNA. It is intended to aid in the diagnosis of vaginal infections in women with a clinical presentation consistent with bacterial vaginosis, vulvovaginal candidiasis, or trichomoniasis.   The assay targets three anaerobic microorgansims that are associated with bacterial vaginosis (BV). Other organisms that are not detected by the Xpert  Xpress MVP test have also been reported to be associated with BV. The BV organism and Candida species targets of the Xpert  Xpress MVP test can be commensal in women; positive results must be considered in conjunction with other clinical and patient information to determine the disease status.       Medications - No data to display    Assessments & Plan (with Medical Decision Making)     I have reviewed the nursing notes.    I have reviewed the findings, diagnosis, plan and need for follow up with the patient.  (R30.9) Painful urination  Comment: 28 year old female who presents with symptoms of painful urination, frequency, pelvic pain, and urgency that started yesterday.  No OTC medications have been taken.    Had hysterectomy September, 2023.   Had UTI and kidney stones were found on CT after that time.  Is not sexually active.  Non-smoker.  Denies fevers, chills, nausea, vomiting, diarrhea, abdominal, back, and flank pain, and headaches.    MDM:NHT. Lungs CTA  Abdominal assessment negative    Urinalysis: Few bacteria; trace blood; squamous epithelia greater than 9.    UC pending  Wet prep negative    Plan: Education provided for painful urination.  And possible bacterial vaginosis.  No antibiotics were required.  If prescribed antibiotics:  Complete all medications even if your symptoms are gone. May take with food unless instructed not to, to prevent stomach upset.    -Increase fluids.   -Complete all antibiotics even if feeling better.    -Taking antibiotics with food may decrease the symptoms, of an upset stomach, that can occur when taking antibiotics. Antibiotics frequently cause diarrhea. Probiotics or yogurt may help prevent or decrease these symptoms.     FOLLOW UP Return to this facility or see your doctor if ALL symptoms are not gone after three days of treatment.    GET PROMPT MEDICAL ATTENTION if any of the following occur:  Fever over 101 F (38.3 C), No improvement by the third day of treatment, Increasing back or abdominal pain, vomiting, unable to keep fluids or medicine down, weakness, dizziness, or fainting,  discharge from genitalia, pain, redness, or swelling.  These discharge instructions and medications were reviewed with her and understanding verbalized.    This document was prepared using a combination of typing and voice generated software.  While every attempt was made for accuracy, spelling and grammatical errors may exist.    Discharge Medication List as of 2/18/2024 11:18 AM          Final diagnoses:   Painful urination       2/18/2024   [facility]         Denisa Willard, CNP  02/18/24 [id number]

## 2024-02-18 NOTE — DISCHARGE INSTRUCTIONS
If prescribed antibiotics:  Complete all medications even if your symptoms are gone. May take with food unless instructed not to, to prevent stomach upset.    -Increase fluids.   -Complete all antibiotics even if feeling better.    -Taking antibiotics with food may decrease the symptoms, of an upset stomach, that can occur when taking antibiotics. Antibiotics frequently cause diarrhea. Probiotics or yogurt may help prevent or decrease these symptoms.     FOLLOW UP Return to this facility or see your doctor if ALL symptoms are not gone after three days of treatment.    GET PROMPT MEDICAL ATTENTION if any of the following occur:  Fever over 101 F (38.3 C), No improvement by the third day of treatment, Increasing back or abdominal pain, vomiting, unable to keep fluids or medicine down, weakness, dizziness, or fainting,  discharge from genitalia, pain, redness, or swelling.

## 2024-02-27 ENCOUNTER — OFFICE VISIT (OUTPATIENT)
Dept: FAMILY MEDICINE | Facility: OTHER | Age: 29
End: 2024-02-27
Attending: STUDENT IN AN ORGANIZED HEALTH CARE EDUCATION/TRAINING PROGRAM
Payer: COMMERCIAL

## 2024-02-27 VITALS
OXYGEN SATURATION: 96 % | HEART RATE: 77 BPM | TEMPERATURE: 97.7 F | BODY MASS INDEX: 29.3 KG/M2 | DIASTOLIC BLOOD PRESSURE: 74 MMHG | SYSTOLIC BLOOD PRESSURE: 109 MMHG | WEIGHT: 176.1 LBS | RESPIRATION RATE: 16 BRPM

## 2024-02-27 DIAGNOSIS — K04.7 DENTAL INFECTION: Primary | ICD-10-CM

## 2024-02-27 DIAGNOSIS — K02.9 DENTAL DECAY: ICD-10-CM

## 2024-02-27 PROCEDURE — 99213 OFFICE O/P EST LOW 20 MIN: CPT | Performed by: STUDENT IN AN ORGANIZED HEALTH CARE EDUCATION/TRAINING PROGRAM

## 2024-02-27 PROCEDURE — G0463 HOSPITAL OUTPT CLINIC VISIT: HCPCS

## 2024-02-27 RX ORDER — CHLORHEXIDINE GLUCONATE ORAL RINSE 1.2 MG/ML
15 SOLUTION DENTAL 2 TIMES DAILY
Qty: 473 ML | Refills: 0 | Status: SHIPPED | OUTPATIENT
Start: 2024-02-27

## 2024-02-27 ASSESSMENT — PAIN SCALES - GENERAL: PAINLEVEL: NO PAIN (1)

## 2024-02-27 NOTE — PROGRESS NOTES
Assessment & Plan     Dental infection  Reviewed Emergency Department notes - x2 visits since January   Developing more pain again, this would be 3rd infection in last two months  Will give Augmentin to have on hand if this infection worsens, reviewed how to take to minimize side effects and should add probiotics  No abscess, significant erythema, or facial swelling today but does have some tenderness over her right upper molar  Reviewed signs and symptoms that would indicate need for urgent follow-up  Appreciate social work assistance with coordinating dental visit  - amoxicillin-clavulanate (AUGMENTIN) 875-125 MG tablet; Take 1 tablet by mouth 2 times daily for 7 days    Dental decay  Will give chlorhexidine to use twice daily until dental follow-up  - chlorhexidine (PERIDEX) 0.12 % solution; Swish and spit 15 mLs in mouth 2 times daily        Diogenes Boateng is a 28 year old, presenting for the following health issues:  Follow Up (Tooth pain)        2/27/2024     9:35 AM   Additional Questions   Roomed by Abdiel Arrington   Accompanied by Son's         2/27/2024     9:35 AM   Patient Reported Additional Medications   Patient reports taking the following new medications None     HPI     Concern - Tooth pain (Top and bottom both sides)  Onset: 1/27/2024  Description: Patient states that the teeth in the back of her mouth hurt on both sides. She was on an antibiotic and said that it helped but feels like it might be getting bad again. Patient states that she does not have a dentist and is not able to get into one since no one is accepting new patients   Intensity: mild  Progression of Symptoms:  worsening and waxing and waning  Accompanying Signs & Symptoms: Tooth pain   Previous history of similar problem: Yes   Precipitating factors:        Worsened by: Hot and cold sensitivity   Alleviating factors:        Improved by: Clindamycin   Therapies tried and outcome: Ibuprofen and tylenol     Back upper teeth have  been hurting, so has jaw   Was swollen - clindamycin helped - finished 2/7/24   Overall doing better than she was then   Can feel it slowly coming back - not in pain today   1st time she got abx in January that helped too   Hasn't been able to get into a dentist         Objective    /74 (BP Location: Left arm, Patient Position: Sitting, Cuff Size: Adult Regular)   Pulse 77   Temp 97.7  F (36.5  C) (Tympanic)   Resp 16   Wt 79.9 kg (176 lb 1.6 oz)   LMP 08/31/2023 (Approximate)   SpO2 96%   BMI 29.30 kg/m    Body mass index is 29.3 kg/m .    Physical Exam  Constitutional:       General: She is not in acute distress.     Appearance: Normal appearance. She is not ill-appearing.   HENT:      Head: Normocephalic and atraumatic.      Nose: Nose normal.      Mouth/Throat:      Mouth: Mucous membranes are moist.      Dentition: Dental tenderness and dental caries present. No gingival swelling or dental abscesses.      Pharynx: Oropharynx is clear. No oropharyngeal exudate or posterior oropharyngeal erythema.        Comments: No facial swelling  Tender mildly over tooth marked  Minimal gum erythema  No swelling or abscess   Cardiovascular:      Rate and Rhythm: Normal rate and regular rhythm.      Heart sounds: No murmur heard.  Pulmonary:      Effort: Pulmonary effort is normal.      Breath sounds: Normal breath sounds.   Musculoskeletal:      Cervical back: Neck supple.   Lymphadenopathy:      Cervical: No cervical adenopathy.   Neurological:      Mental Status: She is alert.                    Signed Electronically by: Nirali Stephens MD

## 2024-02-27 NOTE — PATIENT INSTRUCTIONS
[facility] dental services ([place], [place], [place]) - [phone]    Take probiotics if taking the Augmentin and be sure to take with food  Can start augmentin if worsening pain/signs of infection again. If not improving in 24-48 hours, need to be seen.

## 2024-02-28 ENCOUNTER — PATIENT OUTREACH (OUTPATIENT)
Dept: FAMILY MEDICINE | Facility: OTHER | Age: 29
End: 2024-02-28

## 2024-02-28 NOTE — TELEPHONE ENCOUNTER
Clinic Care Coordination Contact  Care Team Conversations    ANDREA reached out to Tasneem following referral from PCP for assistance with dental resources. She reports that she does not have a dentist currently and has been struggling to find someone who will see her. She has a tooth that us bothering her and reports there is chunks missing and she feels she needs to be seen and establish care.    She called her insurance and they told her that she could only be seen within [facility]. She called the list of dentist offices they gave her and while they were accepting her insurance, none of them were accepting new patients at this time.    ANDREA provided contact information for [facility] clinic in [facility]. Tasneem states she will plan to reach out to see if they have any available openings.     ANDREA and Tasneem make plans to touch base at a later date to ensure she was able to get scheduled.    Ema Briceño, [facility]

## 2024-03-01 ENCOUNTER — PATIENT OUTREACH (OUTPATIENT)
Dept: FAMILY MEDICINE | Facility: OTHER | Age: 29
End: 2024-03-01

## 2024-03-01 NOTE — TELEPHONE ENCOUNTER
Clinic Care Coordination Contact  Care Team Conversations    SW reached out to Tasneem to inquire if she was able to get appointment set up with [facility] office in [facility]. No answer. ANDREA left  with request for call back.    KARINA Rebollar  [employer]

## 2024-03-05 ENCOUNTER — PATIENT OUTREACH (OUTPATIENT)
Dept: FAMILY MEDICINE | Facility: OTHER | Age: 29
End: 2024-03-05

## 2024-03-05 NOTE — TELEPHONE ENCOUNTER
Clinic Care Coordination Contact  Care Team Conversations    SW reached out to Tasneem to inquire if she was able to get appointment set up with [facility] dental office in [facility]. She reported that she has not yet called them as she has been busy with sick children and other things going on around the house.     She reports she will try to call this week. Agreeable to ANDREA touching base at a later date to ensure she is able to get set up.    Ema Briceño, KARINA  [facility]

## 2024-03-11 ENCOUNTER — PATIENT OUTREACH (OUTPATIENT)
Dept: FAMILY MEDICINE | Facility: OTHER | Age: 29
End: 2024-03-11

## 2024-03-11 NOTE — TELEPHONE ENCOUNTER
Clinic Care Coordination Contact  Care Team Conversations    SW reached out to Tasneem to inquire if she was able to get appointment set up with [facility] office in [facility]. She reports that she was able to call them last week and they informed her that they would give her a call back this week. She is currently waiting to hear back from them regarding getting in for an appointment.     ANDREA will touch base at a later date to ensure she is able to get scheduled.     Ema Briceño, KARINA  [facility]

## 2024-03-18 ENCOUNTER — PATIENT OUTREACH (OUTPATIENT)
Dept: FAMILY MEDICINE | Facility: OTHER | Age: 29
End: 2024-03-18

## 2024-03-18 NOTE — TELEPHONE ENCOUNTER
Clinic Care Coordination Contact  Care Team Conversations    ANDREA reached out to Tasneem again to inquire if Malissa Vasquez called her back and if she was able to get an appointment set up. No answer. ANDREA left  with request for call back. ANDREA will reatempt at a later date.    Ema Briceño, BOOM  [facility]

## 2024-03-21 ENCOUNTER — PATIENT OUTREACH (OUTPATIENT)
Dept: FAMILY MEDICINE | Facility: OTHER | Age: 29
End: 2024-03-21

## 2024-03-21 NOTE — TELEPHONE ENCOUNTER
Clinic Care Coordination Contact  Care Team Conversations    ANDREA reached out to Tasneem again to inquire if Malissa Vasquez called her back and if she was able to get an appointment set up. No answer. ANDREA left  with request for call back.     ANDREA will reatempt at a later date.     Ema Briceño, BOOM  [facility]

## 2024-03-26 ENCOUNTER — PATIENT OUTREACH (OUTPATIENT)
Dept: FAMILY MEDICINE | Facility: OTHER | Age: 29
End: 2024-03-26

## 2024-03-26 NOTE — Clinical Note
FYI - Unfortunately I am not aware of any other resources if this one in [place] won't accept her... From the sounds of it, it seems as though her insurance will only allow her to be seen within their county.

## 2024-03-26 NOTE — TELEPHONE ENCOUNTER
Clinic Care Coordination Contact  Care Team Conversations    SW received call back from Tasneem. She reports that she reached out to [facility] in [facility]. They do accept IM Care but they are not accepting new patients at this time. Tasneem states she reached out to her insurance directly and they gave her a list of all of the dental providers in [facility] that accept her insurance. She has now reached out to all of them and while they do accept her insurance, none of them are accepting new patients. She was informed by her insurance that she is only able to see providers within [facility].     ANDREA explained she is not aware of any other dental resources in [facility]. ANDREA explained she could attempt to call [facility] in [place] and see if they would see her, as they see patients without insurance. Tasneem stated this is actually where she used to go when she lived in [facility]. ANDREA acknowledged they may not take her as she technically does have insurance and she is not a [facility] resident, but it is worth a try.     Tasneem states she will plan to give [facility] a call and that she will keep this SW updated with whether or not she is able to get in.    Ema Briceño, [facility]

## 2024-03-26 NOTE — TELEPHONE ENCOUNTER
Clinic Care Coordination Contact  Care Team Conversations    SW reached out to Tasneem again to inquire if Malissa Vasquez called her back and if she was able to get an appointment set up. She states they did call her back but now she needs to return their call to schedule. She states she will plan to do this next week sometime as she does not want to call this week with the weather being bad.     She thanked this SW for her help in providing the number and reports she will reach out to this SW directly if she is unable to get appointment scheduled. ANDREA encouraged her to do this as reminded Tasneem that she may have other options to assist if S[facility]ivas[facility] Vasquez is unable to get her in.     No further questions or concerns at this time.     Ema Briceño, BOOM  [facility]

## 2024-03-26 NOTE — Clinical Note
FYI - if she ends up not being able to get in please do not hesitate to loop me back in as there may be other options!

## 2024-04-02 ENCOUNTER — PATIENT OUTREACH (OUTPATIENT)
Dept: FAMILY MEDICINE | Facility: OTHER | Age: 29
End: 2024-04-02

## 2024-04-02 NOTE — Clinical Note
FYI - such a bummer. Wish there was more resources for her. If it ever becomes something more critical there may be more resources available to her so feel free to loop me back in.

## 2024-04-02 NOTE — TELEPHONE ENCOUNTER
Clinic Care Coordination Contact  Care Team Conversations    SW reached out to Tasneem to ciro sung if she was able to contact the [facility] in [place] regarding her dental concern. She reports that she did get a hold of them but unfortunately as she has insurance, she would need to privately pay which she cannot afford at this time.    In the meantime, she is planning to continually call the various dental offices within [facility] that accept her insurance. She will plan to wait for an opening or cancellation at one of these facilities.      ANDREA provided listening support. No further questions or concerns at this time. No further outreach from this SW.    Ema Briceño, [facility]

## 2024-04-23 ENCOUNTER — TELEPHONE (OUTPATIENT)
Dept: FAMILY MEDICINE | Facility: OTHER | Age: 29
End: 2024-04-23

## 2024-04-23 DIAGNOSIS — L01.00 IMPETIGO: Primary | ICD-10-CM

## 2024-04-23 NOTE — TELEPHONE ENCOUNTER
Care Team 2  /   Dr. Stephens    Patient called regarding her impetigo.  She is not able to see Dr. Stephens until May 6th and is requesting that an antibiotic be called in for her.  Pharmacy is [facility]  [place].  Patient requesting a phone call back @ [phone] to let her know if this will be approved.    Thank you  Perla Oro

## 2024-04-24 RX ORDER — MUPIROCIN 20 MG/G
OINTMENT TOPICAL 3 TIMES DAILY
Qty: 15 G | Refills: 0 | Status: SHIPPED | OUTPATIENT
Start: 2024-04-24 | End: 2024-06-06

## 2024-04-24 NOTE — TELEPHONE ENCOUNTER
Writer called and spoke with patient. Patient stated she is not able to make it in due to her child having surgery. Writer offered an appointment for 4/24/2024, but patient declined. Patient is wondering if there is anything else you can do. Please advise.

## 2024-04-24 NOTE — TELEPHONE ENCOUNTER
Writer called and updated patient with information. Patient verbalized understanding and had no further questions.

## 2024-05-15 ENCOUNTER — HOSPITAL ENCOUNTER (EMERGENCY)
Facility: HOSPITAL | Age: 29
Discharge: HOME | End: 2024-05-15
Attending: NURSE PRACTITIONER | Admitting: NURSE PRACTITIONER
Payer: COMMERCIAL

## 2024-05-15 VITALS
TEMPERATURE: 97.8 F | RESPIRATION RATE: 19 BRPM | SYSTOLIC BLOOD PRESSURE: 105 MMHG | DIASTOLIC BLOOD PRESSURE: 72 MMHG | OXYGEN SATURATION: 98 % | HEART RATE: 77 BPM

## 2024-05-15 DIAGNOSIS — B37.31 VAGINAL YEAST INFECTION: Primary | ICD-10-CM

## 2024-05-15 LAB
ALBUMIN UR-MCNC: NEGATIVE MG/DL
APPEARANCE UR: CLEAR
BACTERIAL VAGINOSIS VAG-IMP: NEGATIVE
BILIRUB UR QL STRIP: NEGATIVE
CANDIDA DNA VAG QL NAA+PROBE: DETECTED
CANDIDA GLABRATA / CANDIDA KRUSEI DNA: NOT DETECTED
COLOR UR AUTO: ABNORMAL
GLUCOSE UR STRIP-MCNC: NEGATIVE MG/DL
HGB UR QL STRIP: NEGATIVE
KETONES UR STRIP-MCNC: ABNORMAL MG/DL
LEUKOCYTE ESTERASE UR QL STRIP: NEGATIVE
NITRATE UR QL: NEGATIVE
PH UR STRIP: 6.5 [PH] (ref 4.7–8)
RBC URINE: <1 /HPF
SP GR UR STRIP: 1.01 (ref 1–1.03)
SQUAMOUS EPITHELIAL: 1 /HPF
T VAGINALIS DNA VAG QL NAA+PROBE: NOT DETECTED
UROBILINOGEN UR STRIP-MCNC: NORMAL MG/DL
WBC URINE: <1 /HPF

## 2024-05-15 PROCEDURE — 81001 URINALYSIS AUTO W/SCOPE: CPT | Performed by: NURSE PRACTITIONER

## 2024-05-15 PROCEDURE — 0352U MULTIPLEX VAGINAL PANEL BY PCR: CPT | Performed by: NURSE PRACTITIONER

## 2024-05-15 PROCEDURE — G0463 HOSPITAL OUTPT CLINIC VISIT: HCPCS

## 2024-05-15 PROCEDURE — 99213 OFFICE O/P EST LOW 20 MIN: CPT | Performed by: NURSE PRACTITIONER

## 2024-05-15 RX ORDER — FLUCONAZOLE 150 MG/1
TABLET ORAL
Qty: 2 TABLET | Refills: 0 | Status: SHIPPED | OUTPATIENT
Start: 2024-05-15 | End: 2024-05-18

## 2024-05-15 ASSESSMENT — ENCOUNTER SYMPTOMS
FREQUENCY: 0
FLANK PAIN: 0
NAUSEA: 0
FEVER: 0
VOMITING: 0
DYSURIA: 1
SHORTNESS OF BREATH: 0
CHILLS: 0
HEMATURIA: 0
ABDOMINAL PAIN: 0

## 2024-05-15 ASSESSMENT — COLUMBIA-SUICIDE SEVERITY RATING SCALE - C-SSRS
6. HAVE YOU EVER DONE ANYTHING, STARTED TO DO ANYTHING, OR PREPARED TO DO ANYTHING TO END YOUR LIFE?: NO
2. HAVE YOU ACTUALLY HAD ANY THOUGHTS OF KILLING YOURSELF IN THE PAST MONTH?: NO
1. IN THE PAST MONTH, HAVE YOU WISHED YOU WERE DEAD OR WISHED YOU COULD GO TO SLEEP AND NOT WAKE UP?: NO

## 2024-05-15 ASSESSMENT — ACTIVITIES OF DAILY LIVING (ADL): ADLS_ACUITY_SCORE: 35

## 2024-05-15 NOTE — ED PROVIDER NOTES
History     Chief Complaint   Patient presents with    Dysuria     HPI  Tasneem Harrington is a 28 year old female who presents urgent care today ambulatory with complaints of dysuria and vaginal irritation.  Symptoms started 4 days ago.  Denies any rashes.  Denies any risk of STDs, genital sores, redness.  Denies any abdominal, pelvic or flank pain.  Denies any fever, chills, nausea, vomiting, diarrhea, shortness of breath or chest pain.  No OTC meds.  No other concerns.    Allergies:  Allergies   Allergen Reactions    Codeine Hives, Itching and Nausea    Latex Itching, Rash and Hives    Morphine Hives and Nausea and Vomiting    Sulfamethoxazole-Trimethoprim Hives, Nausea and Vomiting and Rash       Problem List:    Patient Active Problem List    Diagnosis Date Noted    Surgery, elective 09/20/2023     Priority: Medium    Depression 06/16/2023     Priority: Medium     History of lexapro       TMJ (temporomandibular joint syndrome) 05/12/2023     Priority: Medium    Menorrhagia with regular cycle 05/05/2023     Priority: Medium    Irritable bowel syndrome with constipation 03/01/2022     Priority: Medium    Intractable migraine with aura without status migrainosus 07/09/2019     Priority: Medium    Endometriosis 03/26/2019     Priority: Medium     Laparoscopy at [facility]       Oral herpes 10/25/2018     Priority: Medium     HSV 1. Oral HSV          Past Medical History:    Past Medical History:   Diagnosis Date    Endometriosis     Endometriosis     Migraine     Postpartum depression 02/11/2019    Tinea versicolor        Past Surgical History:    Past Surgical History:   Procedure Laterality Date    LAPAROSCOPIC ASSISTED HYSTERECTOMY VAGINAL N/A 9/20/2023    Procedure: HYSTERECTOMY, VAGINAL, LAPAROSCOPY-ASSISTED, Lysis of adhesions, fulguration of endometriosis;  Surgeon: Yevgeniy Brenner MD;  Location: [facility]    LAPAROSCOPY      TONSILLECTOMY      TUBAL LIGATION  2019       Family History:     Family History   Problem Relation Age of Onset    Hypertension Father     Hyperlipidemia Father     Crohn's Disease Brother     Heart Disease Maternal Grandmother         leaky valve    Aneurysm Maternal Grandmother     Crohn's Disease Cousin     Cancer Paternal Uncle         throat cancer    Hyperlipidemia Son        Social History:  Marital Status:   [2]  Social History     Tobacco Use    Smoking status: Never     Passive exposure: Current    Smokeless tobacco: Never   Vaping Use    Vaping status: Never Used   Substance Use Topics    Alcohol use: Not Currently     Comment: rare    Drug use: Never        Medications:    fluconazole (DIFLUCAN) 150 MG tablet  chlorhexidine (PERIDEX) 0.12 % solution  diclofenac (VOLTAREN) 1 % topical gel  ibuprofen (ADVIL/MOTRIN) 800 MG tablet  mupirocin (BACTROBAN) 2 % external ointment      Review of Systems   Constitutional:  Negative for chills and fever.   Respiratory:  Negative for shortness of breath.    Cardiovascular:  Negative for chest pain.   Gastrointestinal:  Negative for abdominal pain, nausea and vomiting.   Genitourinary:  Positive for dysuria. Negative for decreased urine volume, flank pain, frequency, genital sores, hematuria, pelvic pain, vaginal bleeding, vaginal discharge and vaginal pain.        Vaginal irritation   Musculoskeletal:  Negative for gait problem.   Skin:  Negative for rash.     Physical Exam   BP: 105/72  Pulse: 77  Temp: 97.8  F (36.6  C)  Resp: 19  SpO2: 98 %    Physical Exam  Vitals and nursing note reviewed.   Constitutional:       General: She is not in acute distress.     Appearance: Normal appearance. She is not ill-appearing or toxic-appearing.   Cardiovascular:      Rate and Rhythm: Normal rate and regular rhythm.      Pulses: Normal pulses.      Heart sounds: Normal heart sounds.   Pulmonary:      Effort: Pulmonary effort is normal.      Breath sounds: Normal breath sounds.   Abdominal:      General: Bowel sounds are normal.       Palpations: Abdomen is soft.      Tenderness: There is no abdominal tenderness. There is no right CVA tenderness or left CVA tenderness.   Skin:     General: Skin is warm and dry.   Neurological:      Mental Status: She is alert.   Psychiatric:         Mood and Affect: Mood normal.       ED Course     Results for orders placed or performed during the hospital encounter of 05/15/24 (from the past 24 hour(s))   UA with Microscopic reflex to Culture    Specimen: Urine, Clean Catch   Result Value Ref Range    Color Urine Straw Colorless, Straw, Light Yellow, Yellow    Appearance Urine Clear Clear    Glucose Urine Negative Negative mg/dL    Bilirubin Urine Negative Negative    Ketones Urine Trace (A) Negative mg/dL    Specific Gravity Urine 1.007 1.003 - 1.035    Blood Urine Negative Negative    pH Urine 6.5 4.7 - 8.0    Protein Albumin Urine Negative Negative mg/dL    Urobilinogen Urine Normal Normal, 2.0 mg/dL    Nitrite Urine Negative Negative    Leukocyte Esterase Urine Negative Negative    RBC Urine <1 <=2 /HPF    WBC Urine <1 <=5 /HPF    Squamous Epithelials Urine 1 <=1 /HPF    Narrative    Urine Culture not indicated   Multiplex Vaginal Panel by PCR    Specimen: Vagina; Swab   Result Value Ref Range    Bacterial Vaginosis Organism DNA Negative Negative    Candida Group DNA Detected (A) Not Detected    Candida glabrata / Mai krusei DNA Not Detected Not Detected    Trichomonas vaginalis DNA Not Detected Not Detected    Narrative    The Xpert  Xpress MVP test, performed on the [employer], is an automated, qualitative in vitro diagnostic test for the detection of DNA targets from anaerobic bacteria associated with bacterial vaginosis, Candida species associated with vulvovaginal candidiasis, and Trichomonas vaginalis. The assay uses clinician-collected and self-collected vaginal swabs from patients who are symptomatic for vaginitis/ vaginosis. The Xpert  Xpress MVP test utilizes real-time  polymerase chain reaction (PCR) for the amplification of specific DNA targets and utilizes fluorogenic target-specific hybridization probes to detect and differentiate DNA. It is intended to aid in the diagnosis of vaginal infections in women with a clinical presentation consistent with bacterial vaginosis, vulvovaginal candidiasis, or trichomoniasis.   The assay targets three anaerobic microorgansims that are associated with bacterial vaginosis (BV). Other organisms that are not detected by the Xpert  Xpress MVP test have also been reported to be associated with BV. The BV organism and Candida species targets of the Xpert  Xpress MVP test can be commensal in women; positive results must be considered in conjunction with other clinical and patient information to determine the disease status.       Medications - No data to display    Assessments & Plan (with Medical Decision Making)     I have reviewed the nursing notes.    I have reviewed the findings, diagnosis, plan and need for follow up with the patient.  (B37.31) Vaginal yeast infection  (primary encounter diagnosis)  Plan:   Patient ambulatory with a nontoxic appearance.  Patient arrived with complaints of dysuria and vaginal irritation.  No abdominal pain or CVA tenderness.  Denies any fever, chills, nausea, vomiting, diarrhea, shortness of breath or chest pain.  Denies any risk of STDs, genital sores or rashes.  UA completed, no signs of urinary tract infection at this time.  Vaginal panel completed and positive for candida, will treat with fluconazole.  Patient to follow-up with primary care provider or return to urgent care/ED with any worsening in condition or additional concerns.  Patient in agreement treatment plan.    Discharge Medication List as of 5/15/2024 10:29 AM        Final diagnoses:   Vaginal yeast infection     5/15/2024   [facility]       Azeb Arrington NP  05/15/24 1116

## 2024-05-15 NOTE — DISCHARGE INSTRUCTIONS
Will notify you of vaginal panel results once they finalized    Push fluids     Follow-up with primary care provider or return to urgent care/ED with any worsening condition or additional concerns.

## 2024-05-16 ENCOUNTER — TELEPHONE (OUTPATIENT)
Dept: FAMILY MEDICINE | Facility: OTHER | Age: 29
End: 2024-05-16

## 2024-05-16 ENCOUNTER — OFFICE VISIT (OUTPATIENT)
Dept: FAMILY MEDICINE | Facility: OTHER | Age: 29
End: 2024-05-16
Attending: STUDENT IN AN ORGANIZED HEALTH CARE EDUCATION/TRAINING PROGRAM
Payer: COMMERCIAL

## 2024-05-16 VITALS
OXYGEN SATURATION: 96 % | SYSTOLIC BLOOD PRESSURE: 102 MMHG | WEIGHT: 172.9 LBS | TEMPERATURE: 98.6 F | HEIGHT: 65 IN | DIASTOLIC BLOOD PRESSURE: 66 MMHG | BODY MASS INDEX: 28.81 KG/M2 | RESPIRATION RATE: 16 BRPM | HEART RATE: 80 BPM

## 2024-05-16 DIAGNOSIS — B00.9 HSV (HERPES SIMPLEX VIRUS) INFECTION: Primary | ICD-10-CM

## 2024-05-16 PROBLEM — N92.0 MENORRHAGIA WITH REGULAR CYCLE: Status: RESOLVED | Noted: 2023-05-05 | Resolved: 2024-05-16

## 2024-05-16 PROBLEM — Z90.710 S/P HYSTERECTOMY: Status: ACTIVE | Noted: 2024-05-16

## 2024-05-16 PROBLEM — N80.9 ENDOMETRIOSIS: Chronic | Status: RESOLVED | Noted: 2019-03-26 | Resolved: 2024-05-16

## 2024-05-16 PROCEDURE — 99213 OFFICE O/P EST LOW 20 MIN: CPT | Performed by: STUDENT IN AN ORGANIZED HEALTH CARE EDUCATION/TRAINING PROGRAM

## 2024-05-16 PROCEDURE — G0463 HOSPITAL OUTPT CLINIC VISIT: HCPCS

## 2024-05-16 RX ORDER — VALACYCLOVIR HYDROCHLORIDE 1 G/1
1000 TABLET, FILM COATED ORAL 2 TIMES DAILY
Qty: 6 TABLET | Refills: 0 | Status: SHIPPED | OUTPATIENT
Start: 2024-05-16 | End: 2024-05-17

## 2024-05-16 ASSESSMENT — PAIN SCALES - GENERAL: PAINLEVEL: MILD PAIN (3)

## 2024-05-16 NOTE — TELEPHONE ENCOUNTER
10:28 AM    Reason for Call: OVERBOOK    Patient is having the following symptoms: Patient needs to be seen for possible cold sore very painful days.    The patient is requesting an appointment for Overbook with .    Was an appointment offered for this call? No  If yes : Appointment type              Date    Preferred method for responding to this message: Telephone Call  What is your phone number ?  [phone]    If we cannot reach you directly, may we leave a detailed response at the number you provided? Yes    Can this message wait until your PCP/provider returns, if unavailable today? Provider is in today    Anamika Booker

## 2024-05-16 NOTE — PROGRESS NOTES
Assessment & Plan     HSV (herpes simplex virus) infection  Exam consistent with HSV infection, similar to how she has presented in the past.  Will treat with Valtrex 1 g twice daily for 1 day.  A few extra tabs given in case flares in the future.  No indication for suppressive therapy at this time.  Discussed supportive care and minimizing spread by avoiding kissing, sharing drinks or utensils with active lesions.  - valACYclovir (VALTREX) 1000 mg tablet; Take 1 tablet (1,000 mg) by mouth 2 times daily for 1 day Only take for one day with each flare of cold sores.        Diogenes Boateng is a 28 year old, presenting for the following health issues:  Sore        5/16/2024     1:57 PM   Additional Questions   Roomed by ELIZABETH Workman   Accompanied by son     History of Present Illness       Reason for visit:  Painful sore starting    She eats 0-1 servings of fruits and vegetables daily.She consumes 1 sweetened beverage(s) daily.She exercises with enough effort to increase her heart rate 10 to 19 minutes per day.  She exercises with enough effort to increase her heart rate 4 days per week.   She is taking medications regularly.         Concern - sore in the corner of her lip  Onset: this morning  Description: is red and patient feels like more are coming- stated that she used to have a pill that she would take for them  Intensity: mild  Progression of Symptoms:  same  Accompanying Signs & Symptoms: itching  Previous history of similar problem: yes  Precipitating factors:        Worsened by: none  Alleviating factors:        Improved by: none  Therapies tried and outcome: None    Painful, burning. History of cold sores. Had been well controlled after her hysterectomy.   Was out in the sun more a few days ago.   No topicals - don't usually work.   No vaginal sores.   Just started up this AM.   A little more stress.   Classic presentation to prior cold sores.   Usually gets every few months.   No one else with them.  "  No hand/foot or other mouth lesions.         Objective    /66 (BP Location: Left arm, Patient Position: Sitting, Cuff Size: Adult Regular)   Pulse 80   Temp 98.6  F (37  C) (Tympanic)   Resp 16   Ht 1.651 m (5' 5\")   Wt 78.4 kg (172 lb 14.4 oz)   LMP 08/31/2023 (Approximate)   SpO2 96%   BMI 28.77 kg/m    Body mass index is 28.77 kg/m .  Physical Exam  Constitutional:       General: She is not in acute distress.     Appearance: Normal appearance. She is not ill-appearing.   HENT:      Mouth/Throat:      Lips: Pink. Lesions present.      Mouth: Mucous membranes are moist. No oral lesions.      Tongue: No lesions.      Pharynx: Oropharynx is clear. Uvula midline.        Comments: Area of lesion noted above.   Pulmonary:      Effort: Pulmonary effort is normal.   Skin:     General: Skin is warm and dry.      Capillary Refill: Capillary refill takes less than 2 seconds.      Findings: No lesion or rash.   Neurological:      General: No focal deficit present.      Mental Status: She is alert and oriented to person, place, and time.   Psychiatric:         Mood and Affect: Mood normal.         Behavior: Behavior normal.                Signed Electronically by: Nirali Stephens MD    "

## 2024-06-03 ENCOUNTER — MYC MEDICAL ADVICE (OUTPATIENT)
Dept: FAMILY MEDICINE | Facility: OTHER | Age: 29
End: 2024-06-03

## 2024-06-05 NOTE — TELEPHONE ENCOUNTER
Pt called and pt reported having burning and pain with urination, white thick discharge with no odor, pt report feeling itchy.   Pt reported that the medication worked for awhile but did not take the yeast infection away.  Just help with some of the symptoms for awhile.  Pt reported finishing they fluconazole 150 mg.  Pt stated that she was to reach out to PCP with any further symptoms.

## 2024-06-06 ENCOUNTER — APPOINTMENT (OUTPATIENT)
Dept: LAB | Facility: OTHER | Age: 29
End: 2024-06-06
Payer: COMMERCIAL

## 2024-06-06 ENCOUNTER — OFFICE VISIT (OUTPATIENT)
Dept: FAMILY MEDICINE | Facility: OTHER | Age: 29
End: 2024-06-06
Attending: STUDENT IN AN ORGANIZED HEALTH CARE EDUCATION/TRAINING PROGRAM
Payer: COMMERCIAL

## 2024-06-06 VITALS
OXYGEN SATURATION: 99 % | BODY MASS INDEX: 28.89 KG/M2 | HEART RATE: 81 BPM | DIASTOLIC BLOOD PRESSURE: 74 MMHG | TEMPERATURE: 98.2 F | WEIGHT: 173.6 LBS | SYSTOLIC BLOOD PRESSURE: 110 MMHG

## 2024-06-06 DIAGNOSIS — B37.31 CANDIDIASIS OF VAGINA: ICD-10-CM

## 2024-06-06 DIAGNOSIS — B96.89 BACTERIAL VAGINOSIS: ICD-10-CM

## 2024-06-06 DIAGNOSIS — R30.0 DYSURIA: Primary | ICD-10-CM

## 2024-06-06 DIAGNOSIS — N89.8 VAGINAL DISCHARGE: ICD-10-CM

## 2024-06-06 DIAGNOSIS — Z90.710 S/P HYSTERECTOMY: ICD-10-CM

## 2024-06-06 DIAGNOSIS — N76.0 BACTERIAL VAGINOSIS: ICD-10-CM

## 2024-06-06 DIAGNOSIS — N94.10 DYSPAREUNIA IN FEMALE: ICD-10-CM

## 2024-06-06 LAB
ALBUMIN UR-MCNC: NEGATIVE MG/DL
APPEARANCE UR: CLEAR
BACTERIAL VAGINOSIS VAG-IMP: POSITIVE
BILIRUB UR QL STRIP: NEGATIVE
CANDIDA DNA VAG QL NAA+PROBE: DETECTED
CANDIDA GLABRATA / CANDIDA KRUSEI DNA: NOT DETECTED
COLOR UR AUTO: ABNORMAL
GLUCOSE UR STRIP-MCNC: NEGATIVE MG/DL
HGB UR QL STRIP: NEGATIVE
KETONES UR STRIP-MCNC: NEGATIVE MG/DL
LEUKOCYTE ESTERASE UR QL STRIP: NEGATIVE
NITRATE UR QL: NEGATIVE
PH UR STRIP: 7 [PH] (ref 4.7–8)
RBC URINE: 0 /HPF
SP GR UR STRIP: 1.01 (ref 1–1.03)
SQUAMOUS EPITHELIAL: 6 /HPF
T VAGINALIS DNA VAG QL NAA+PROBE: NOT DETECTED
UROBILINOGEN UR STRIP-MCNC: NORMAL MG/DL
WBC URINE: <1 /HPF

## 2024-06-06 PROCEDURE — G0463 HOSPITAL OUTPT CLINIC VISIT: HCPCS

## 2024-06-06 PROCEDURE — 0352U MULTIPLEX VAGINAL PANEL BY PCR: CPT | Mod: ZL | Performed by: STUDENT IN AN ORGANIZED HEALTH CARE EDUCATION/TRAINING PROGRAM

## 2024-06-06 PROCEDURE — 99213 OFFICE O/P EST LOW 20 MIN: CPT | Performed by: STUDENT IN AN ORGANIZED HEALTH CARE EDUCATION/TRAINING PROGRAM

## 2024-06-06 PROCEDURE — 81003 URINALYSIS AUTO W/O SCOPE: CPT | Mod: ZL | Performed by: STUDENT IN AN ORGANIZED HEALTH CARE EDUCATION/TRAINING PROGRAM

## 2024-06-06 RX ORDER — FLUCONAZOLE 150 MG/1
150 TABLET ORAL ONCE
Qty: 2 TABLET | Refills: 0 | Status: SHIPPED | OUTPATIENT
Start: 2024-06-06 | End: 2024-06-06

## 2024-06-06 RX ORDER — METRONIDAZOLE 500 MG/1
500 TABLET ORAL 2 TIMES DAILY
Qty: 14 TABLET | Refills: 0 | Status: SHIPPED | OUTPATIENT
Start: 2024-06-06 | End: 2024-06-13

## 2024-06-06 ASSESSMENT — PAIN SCALES - GENERAL: PAINLEVEL: NO PAIN (0)

## 2024-06-06 NOTE — PROGRESS NOTES
Assessment & Plan     Dysuria  Vaginal discharge  Symptoms improved in terms of vaginal discharge and labial swelling, however continues to have occasional dysuria and now increasing vaginal discharge and minimal pruritus.  UA negative for infection.  Exam with thin white discharge, possible bacterial vaginosis.  Swab sent.  Has been using new scented vaginal wash, recommend stopping this.  Reviewed good vaginal hygiene.  Handout given.  Can apply topical Vaseline for external use for itching or irritation.  Follow-up if symptoms fail to improve.  - Multiplex Vaginal Panel by PCR; Future    Dyspareunia in female  S/P hysterectomy  Strong suspicion for pelvic floor dysfunction.  Has had 5 vaginal deliveries and is now status post vaginal hysterectomy.  Would benefit from pelvic floor therapy and referral placed.  Will reassess symptoms in 2 months at physical.  - Physical Therapy  Referral; Future        The longitudinal plan of care for the diagnosis(es)/condition(s) as documented were addressed during this visit. Due to the added complexity in care, I will continue to support Tasneem in the subsequent management and with ongoing continuity of care.    Subjective   Tasneem is a 28 year old, presenting for the following health issues:  Urinary Problem        6/6/2024     9:45 AM   Additional Questions   Roomed by Bib Rodriguez   Accompanied by Son         6/6/2024     9:45 AM   Patient Reported Additional Medications   Patient reports taking the following new medications none     History of Present Illness       Reason for visit:  Yest infection    She eats 2-3 servings of fruits and vegetables daily.She consumes 1 sweetened beverage(s) daily.She exercises with enough effort to increase her heart rate 10 to 19 minutes per day.  She exercises with enough effort to increase her heart rate 4 days per week.   She is taking medications regularly.       Genitourinary - Female  Onset/Duration: Had a yeast infection  last month and still having symptoms   Description:   Painful urination (Dysuria): YES           Frequency: No  Blood in urine (Hematuria): No  Delay in urine (Hesitency): No  Intensity: mild  Progression of Symptoms:  improving from the beginning of the yeast infection   Accompanying Signs & Symptoms:  Fever/chills: No  Flank pain: No  Nausea and vomiting: No  Vaginal symptoms: discharge and itching  Abdominal/Pelvic Pain: No  History:   History of frequent UTI s: YES  History of kidney stones: No but unsure   Sexually Active: YES  Possibility of pregnancy: No  Precipitating or alleviating factors: None  Therapies tried and outcome:  None     5/15 had burning, itching, swelling and discharge. +yeast. Took 1 dose fluconazole that day, then 3 days later. Symptoms did not fully resolve. Swelling went away.  Now, Burning with urination, itching a bit after, then done. Whiteish discharge. This morning when she first went it hurt, now she went and it didn't but she is a little pruritic. No frequency. No blood.    Is using soap for vaginal area - started last month prior to this starting        Objective    /74 (BP Location: Right arm, Patient Position: Sitting, Cuff Size: Adult Regular)   Pulse 81   Temp 98.2  F (36.8  C) (Tympanic)   Wt 78.7 kg (173 lb 9.6 oz)   LMP 08/31/2023 (Approximate)   SpO2 99%   BMI 28.89 kg/m    Body mass index is 28.89 kg/m .    Physical Exam  Constitutional:       General: She is not in acute distress.     Appearance: Normal appearance. She is not ill-appearing.   Abdominal:      Palpations: Abdomen is soft.      Tenderness: There is no abdominal tenderness.   Genitourinary:     General: Normal vulva.      Pubic Area: No rash.       Labia:         Right: No rash or lesion.         Left: No rash or lesion.       Vagina: Vaginal discharge (thin, white) present. No erythema, bleeding or lesions.      Comments: No cervix  Neurological:      Mental Status: She is alert.           Results for orders placed or performed in visit on 06/06/24 (from the past 24 hour(s))   UA with Microscopic reflex to Culture - [place]    Specimen: Urine, Midstream   Result Value Ref Range    Color Urine Straw Colorless, Straw, Light Yellow, Yellow    Appearance Urine Clear Clear    Glucose Urine Negative Negative mg/dL    Bilirubin Urine Negative Negative    Ketones Urine Negative Negative mg/dL    Specific Gravity Urine 1.010 1.003 - 1.035    Blood Urine Negative Negative    pH Urine 7.0 4.7 - 8.0    Protein Albumin Urine Negative Negative mg/dL    Urobilinogen Urine Normal Normal, 2.0 mg/dL    Nitrite Urine Negative Negative    Leukocyte Esterase Urine Negative Negative    RBC Urine 0 <=2 /HPF    WBC Urine <1 <=5 /HPF    Squamous Epithelials Urine 6 (H) <=1 /HPF    Narrative    Urine Culture not indicated           Signed Electronically by: Nirali Stephens MD

## 2024-06-06 NOTE — PATIENT INSTRUCTIONS
"VAGINAL CARE  Wash ONLY with warm water, no soap, no loafas, no bleach, shampoos or bubble baths.   Use mineral oil or vaseline if itching.   Use non-irritating lubricants (avoid scented chemical, anything that has \"extra\" could be irritant)  Avoid shaving or douching  Wear white, wide cotton underwear (breathes better, anytime it is not white it has dyes and additives to fabric)   - wash in very HOT water   - use 1/2 laundry soap, double rinse, do not hand wash   - sleep without underwear, wear loose clothing  Avoid sex 1+ week if symptoms of pain/infection     STOP WASH FOR VAGINAL AREA AND NO SHAVING    A referral was placed for you to do physical therapy.   If this referral was to [facility], if you have not heard anything after 2 business days, please call [phone] to schedule an appointment.     "

## 2024-06-10 ENCOUNTER — THERAPY VISIT (OUTPATIENT)
Dept: PHYSICAL THERAPY | Facility: HOSPITAL | Age: 29
End: 2024-06-10
Attending: STUDENT IN AN ORGANIZED HEALTH CARE EDUCATION/TRAINING PROGRAM
Payer: COMMERCIAL

## 2024-06-10 DIAGNOSIS — N94.10 DYSPAREUNIA IN FEMALE: ICD-10-CM

## 2024-06-10 DIAGNOSIS — Z90.710 S/P HYSTERECTOMY: ICD-10-CM

## 2024-06-10 PROCEDURE — 97530 THERAPEUTIC ACTIVITIES: CPT | Mod: GP

## 2024-06-10 PROCEDURE — 97110 THERAPEUTIC EXERCISES: CPT | Mod: GP

## 2024-06-10 PROCEDURE — 97161 PT EVAL LOW COMPLEX 20 MIN: CPT | Mod: GP

## 2024-06-10 NOTE — PROGRESS NOTES
PHYSICAL THERAPY EVALUATION  Type of Visit: Evaluation    See electronic medical record for Abuse and Falls Screening details.    Subjective       Presenting condition or subjective complaint: Pt presents to PT with c/o pelvic pain, dyspareunia. She has had pain for about 12 years since she had her first baby. Pain has not worsened. She thought it was from endometriosis by had a hysterectomy and it did not improve.  Date of onset: 06/10/24    Relevant medical history:     Dates & types of surgery:      Prior diagnostic imaging/testing results:       Prior therapy history for the same diagnosis, illness or injury: No      Prior Level of Function  Transfers:   Ambulation:   ADL:   IADL:     Living Environment  Social support: With a significant other or spouse   Type of home: House   Stairs to enter the home: Yes 2 Is there a railing: No   Ramp: No   Stairs inside the home: Yes 10 Is there a railing: Yes   Help at home:    Equipment owned:       Employment: No    Hobbies/Interests:      Patient goals for therapy:      Pain assessment: Pain present Rates pain at 5/10 with intercourse     Objective      PELVIC EVALUATION  ADDITIONAL HISTORY:  Sex assigned at birth: Female  Bladder History:  Feels bladder filling: No  Triggers for feeling of inability to wait to go to the bathroom: No    How long can you wait to urinate: Awhile  Gets up at night to urinate: Yes 1  Can stop the flow of urine when urinating: Yes  Volume of urine usually released: Medium   Other issues:    Number of bladder infections in last 12 months:    Fluid intake per day: Alot      Medications taken for bladder: No     Activities causing urine leak:      Amount of urine typically leaked:    Pads used to help with leaking: No        Bowel History:  Frequency of bowel movement: Hard to answer, i can go awhile without going.  Consistency of stool: Hard    Ignores the urge to defecate: Sometimes  Other bowel issues: Pain when pooping  Length of time spent  trying to have a bowel movement:      Sexual Function History:  Sexual orientation: Straight    Sexually active: Yes  Lubrication used: Yes Yes  Pelvic pain: Initial penetration (rectal or vaginal)    Pain or difficulty with orgasms/erection/ejaculation: No    State of menopause:    Hormone medications: No      Are you currently pregnant: No, Number of previous pregnancies: 5, Number of deliveries: 5, If you have delivered before, did you have any of these issues during delivery: Vaginal delivery, Have you been diagnosed with pelvic prolapse or abdominal separation: No, Do you get regular exercise: Yes, I do this type of exercise: Running, Have you tried pelvic floor strengthening exercises for 4 weeks: No, Do you have any history of trauma that is relevant to your care that you d like to share: No    Discussed reason for referral regarding pelvic health needs and external/internal pelvic floor muscle examination with patient/guardian.  Opportunity provided to ask questions and verbal consent for assessment and intervention was given.    PAIN:   POSTURE: WFL  LUMBAR SCREEN: PROM WFL  HIP SCREEN:  Strength: [facility]   Functional Strength Testing:     PELVIC/SI SCREEN:  [facility]   PAIN PROVOCATION TEST: [facility]  PELVIS/SI SPECIAL TESTS: WFL  BREATHING SYMMETRY:     PELVIC EXAM  External Visual Inspection:  At rest: Normal    Integumentary:   Introitus: Unremarkable    External Digital Palpation per Perineum:   Ischiocavernosis: Tightness  Bulbo cavernosis: Tightness  Transverse perineal: Tightness  Levator ani: Tightness  Perineal body: Tightness, Tenderness    Scar: Internal vaginal scar tissue palpated    Internal Digital Palpation:  Per Vagina:  Tenderness  Myofascial Resistance to Palpation: Firm  Digital Muscle Performance: P (Power): 2/5  E (Endurance): 1 sec  Relaxation Post-Contraction: Partial/delayed relaxation      Pelvic Organ Prolapse:   Not observed today    ABDOMINAL ASSESSMENT  Diastasis Rectus Abdominis ():    presence: Yes- 2 finger width at umbilicus     Abdominal Activation/Strength: NT today    Scar:   Location/Type: Scar tissue palpation in vagina    Fascial Tension/Restriction: Hypermobile    Assessment & Plan   CLINICAL IMPRESSIONS  Medical Diagnosis: Dyspareunia    Treatment Diagnosis: Dyspareunia   Impression/Assessment: Patient is a 28 year old female with complaints of dyspareunia.  The following significant findings have been identified: Pain, Decreased ROM/flexibility, Decreased joint mobility, Decreased strength, and Impaired muscle performance. These impairments interfere with their ability to function.      Clinical Decision Making (Complexity):  Clinical Presentation: Stable/Uncomplicated  Clinical Presentation Rationale: based on medical and personal factors listed in PT evaluation  Clinical Decision Making (Complexity): Low complexity    PLAN OF CARE  Treatment Interventions:  Modalities: Biofeedback  Interventions: Manual Therapy, Neuromuscular Re-education, Therapeutic Activity, Therapeutic Exercise    Long Term Goals     PT Goal 1  Goal Identifier: STG 1  Goal Description: Pt will be independent in the performance of a home program of PFM exercises on a daily basis  Target Date: 06/24/24  PT Goal 2  Goal Identifier: LTG 1  Goal Description: Pt will demonstrate an increase in PFM endurance from 0 second hold to 10 second hold x 10 repetitions  Target Date: 09/02/24  PT Goal 3  Goal Identifier: LTG 2  Goal Description: Pt will participate in vaginal intercourse without  being limited by pelvic pain  Target Date: 09/02/24      Frequency of Treatment: 1-2x/week  Duration of Treatment: up to 12 weeks    Recommended Referrals to Other Professionals:   Education Assessment:   Learner/Method: Patient  Education Comments: HEP    Risks and benefits of evaluation/treatment have been explained.   Patient/Family/caregiver agrees with Plan of Care.     Evaluation Time:     PT Eval, Low Complexity Minutes (14621):  15     Signing Clinician: Karyna Dubon, PT

## 2024-07-15 ENCOUNTER — TELEPHONE (OUTPATIENT)
Dept: PEDIATRICS | Facility: OTHER | Age: 29
End: 2024-07-15

## 2024-07-15 DIAGNOSIS — J06.9 UPPER RESPIRATORY TRACT INFECTION, UNSPECIFIED TYPE: Primary | ICD-10-CM

## 2024-07-15 RX ORDER — AZITHROMYCIN 100 MG/5ML
10 POWDER, FOR SUSPENSION ORAL DAILY
Qty: 117 ML | Refills: 0 | Status: SHIPPED | OUTPATIENT
Start: 2024-07-15 | End: 2024-07-18

## 2024-07-28 ENCOUNTER — HEALTH MAINTENANCE LETTER (OUTPATIENT)
Age: 29
End: 2024-07-28

## 2024-10-16 ENCOUNTER — HOSPITAL ENCOUNTER (EMERGENCY)
Facility: HOSPITAL | Age: 29
Discharge: HOME | End: 2024-10-16
Attending: NURSE PRACTITIONER | Admitting: NURSE PRACTITIONER
Payer: COMMERCIAL

## 2024-10-16 VITALS
RESPIRATION RATE: 18 BRPM | HEART RATE: 92 BPM | DIASTOLIC BLOOD PRESSURE: 81 MMHG | OXYGEN SATURATION: 99 % | TEMPERATURE: 98.8 F | SYSTOLIC BLOOD PRESSURE: 128 MMHG

## 2024-10-16 DIAGNOSIS — J01.90 ACUTE SINUSITIS WITH SYMPTOMS > 10 DAYS: Primary | ICD-10-CM

## 2024-10-16 PROCEDURE — 99213 OFFICE O/P EST LOW 20 MIN: CPT | Performed by: NURSE PRACTITIONER

## 2024-10-16 PROCEDURE — G0463 HOSPITAL OUTPT CLINIC VISIT: HCPCS

## 2024-10-16 ASSESSMENT — ENCOUNTER SYMPTOMS
FEVER: 0
NECK PAIN: 0
TROUBLE SWALLOWING: 0
SHORTNESS OF BREATH: 0
VOMITING: 0
CHILLS: 0
HEADACHES: 0
EYE REDNESS: 0
DIARRHEA: 0
RHINORRHEA: 1
NECK STIFFNESS: 0
EYE DISCHARGE: 0
COUGH: 0
SORE THROAT: 0
MYALGIAS: 0
NAUSEA: 0
ABDOMINAL PAIN: 0
PSYCHIATRIC NEGATIVE: 1

## 2024-10-16 ASSESSMENT — COLUMBIA-SUICIDE SEVERITY RATING SCALE - C-SSRS
2. HAVE YOU ACTUALLY HAD ANY THOUGHTS OF KILLING YOURSELF IN THE PAST MONTH?: NO
6. HAVE YOU EVER DONE ANYTHING, STARTED TO DO ANYTHING, OR PREPARED TO DO ANYTHING TO END YOUR LIFE?: NO
1. IN THE PAST MONTH, HAVE YOU WISHED YOU WERE DEAD OR WISHED YOU COULD GO TO SLEEP AND NOT WAKE UP?: NO

## 2024-10-16 NOTE — ED PROVIDER NOTES
History     Chief Complaint   Patient presents with    Dental Pain     HPI  Tasneem Harrington is a 29 year old female who presents to urgent care today ambulatory with complaints of congestion, dental pain, ear pain, rhinorrhea which started over 2 weeks ago.  Denies any fever, chills, nausea, vomiting, diarrhea, shortness of breath or chest pain.  No trismus.  No difficulty swallowing.  No neck pain or stiffness.  No OTC meds, declines anything for pain in urgent care.  No other concerns.    Allergies:  Allergies   Allergen Reactions    Codeine Hives, Itching and Nausea    Latex Itching, Rash and Hives    Morphine Hives and Nausea and Vomiting    Sulfamethoxazole-Trimethoprim Hives, Nausea and Vomiting and Rash       Problem List:    Patient Active Problem List    Diagnosis Date Noted    S/P hysterectomy 05/16/2024     Priority: Medium    Depression 06/16/2023     Priority: Medium     History of lexapro       TMJ (temporomandibular joint syndrome) 05/12/2023     Priority: Medium    Irritable bowel syndrome with constipation 03/01/2022     Priority: Medium    Intractable migraine with aura without status migrainosus 07/09/2019     Priority: Medium    Oral herpes 10/25/2018     Priority: Medium     HSV 1. Oral HSV          Past Medical History:    Past Medical History:   Diagnosis Date    Endometriosis     Endometriosis     Menorrhagia with regular cycle 05/05/2023    Migraine     Postpartum depression 02/11/2019    Tinea versicolor        Past Surgical History:    Past Surgical History:   Procedure Laterality Date    LAPAROSCOPIC ASSISTED HYSTERECTOMY VAGINAL N/A 9/20/2023    Procedure: HYSTERECTOMY, VAGINAL, LAPAROSCOPY-ASSISTED, Lysis of adhesions, fulguration of endometriosis;  Surgeon: Yevgeniy Brenner MD;  Location: [facility]    LAPAROSCOPY      TONSILLECTOMY      TUBAL LIGATION  2019       Family History:    Family History   Problem Relation Age of Onset    Hypertension Father     Hyperlipidemia Father     Crohn's  Disease Brother     Heart Disease Maternal Grandmother         leaky valve    Aneurysm Maternal Grandmother     Crohn's Disease Cousin     Cancer Paternal Uncle         throat cancer    Hyperlipidemia Son        Social History:  Marital Status:   [2]  Social History     Tobacco Use    Smoking status: Never     Passive exposure: Current    Smokeless tobacco: Never   Vaping Use    Vaping status: Never Used   Substance Use Topics    Alcohol use: Not Currently     Comment: rare    Drug use: Never        Medications:    amoxicillin-clavulanate (AUGMENTIN) 875-125 MG tablet  chlorhexidine (PERIDEX) 0.12 % solution  diclofenac (VOLTAREN) 1 % topical gel  ibuprofen (ADVIL/MOTRIN) 800 MG tablet  valACYclovir (VALTREX) 1000 mg tablet      Review of Systems   Constitutional:  Negative for chills and fever.   HENT:  Positive for congestion, dental problem, ear pain and rhinorrhea. Negative for sore throat and trouble swallowing.    Eyes:  Negative for discharge and redness.   Respiratory:  Negative for cough and shortness of breath.    Cardiovascular:  Negative for chest pain.   Gastrointestinal:  Negative for abdominal pain, diarrhea, nausea and vomiting.   Genitourinary:  Negative for decreased urine volume.   Musculoskeletal:  Negative for gait problem, myalgias, neck pain and neck stiffness.   Skin:  Negative for rash.   Neurological:  Negative for headaches.   Psychiatric/Behavioral: Negative.       Physical Exam   BP: 128/81  Pulse: 92  Temp: 98.8  F (37.1  C)  Resp: 18  SpO2: 99 %    Physical Exam  Vitals and nursing note reviewed.   Constitutional:       General: She is not in acute distress.     Appearance: Normal appearance. She is not ill-appearing or toxic-appearing.   HENT:      Head:      Jaw: There is normal jaw occlusion.      Right Ear: Tympanic membrane, ear canal and external ear normal.      Left Ear: Tympanic membrane, ear canal and external ear normal.      Nose: Congestion and rhinorrhea present.       Right Sinus: Maxillary sinus tenderness present. No frontal sinus tenderness.      Left Sinus: Maxillary sinus tenderness present. No frontal sinus tenderness.      Mouth/Throat:      Mouth: Mucous membranes are moist.      Dentition: Normal dentition. No dental tenderness, gingival swelling, dental caries or dental abscesses.      Pharynx: Oropharynx is clear. No oropharyngeal exudate or posterior oropharyngeal erythema.   Cardiovascular:      Rate and Rhythm: Normal rate and regular rhythm.      Pulses: Normal pulses.      Heart sounds: Normal heart sounds.   Pulmonary:      Effort: Pulmonary effort is normal.      Breath sounds: Normal breath sounds.   Musculoskeletal:      Cervical back: Normal range of motion and neck supple. No rigidity or tenderness.   Lymphadenopathy:      Cervical: No cervical adenopathy.   Neurological:      Mental Status: She is alert.   Psychiatric:         Mood and Affect: Mood normal.       ED Course     Procedures    No results found for this or any previous visit (from the past 24 hour(s)).    Medications - No data to display    Assessments & Plan (with Medical Decision Making)     I have reviewed the nursing notes.    I have reviewed the findings, diagnosis, plan and need for follow up with the patient.  (J01.90) Acute sinusitis with symptoms > 10 days  (primary encounter diagnosis)  Plan:   Patient ambulatory with a nontoxic appearance.  Lungs clear throughout.  No signs of otitis media or strep.  Moderate amount of nasal congestion.  Maxillary sinus tenderness present.  No dental abnormalities seen on exam, no dental tenderness, abscess or gingival swelling.  No trismus.  Will treat acute sinusitis with Augmentin.  Recommend over-the-counter Flonase for congestion.  Push fluids.  Alternate Tylenol and ibuprofen for pain.  Follow-up with primary care provider or return to urgent care/ED with any worsening in condition or additional concerns.  Patient in agreement treatment  plan.    New Prescriptions    AMOXICILLIN-CLAVULANATE (AUGMENTIN) 875-125 MG TABLET    Take 1 tablet by mouth 2 times daily for 7 days.     Final diagnoses:   Acute sinusitis with symptoms > 10 days     10/16/2024   [facility]       Azeb Arrington, NP  10/16/24 1048

## 2024-10-16 NOTE — ED TRIAGE NOTES
Pt presents today with c/o dental pain and bilateral ear pain, left side ear is worse. Ears Started 2 days ago, dental pain for 2 weeks.

## 2024-10-16 NOTE — DISCHARGE INSTRUCTIONS
Augmentin as ordered  - Take entire course of antibiotic even if you start to feel better.  - Antibiotics can cause stomach upset including nausea and diarrhea. Read your bottle or ask the pharmacist if antibiotic can be taken with food to help prevent nausea. If you have symptoms of diarrhea you can take an over-the-counter probiotic and/or increase foods with probiotics such as yogurt, [place], sauerkraut.    OTC Flonase as needed for congestion    Push fluids    Alternate tylenol and ibuprofen as needed for pain    Follow-up with primary care provider or return to urgent care/ED with any worsening in condition or additional concerns.

## 2024-11-01 ENCOUNTER — OFFICE VISIT (OUTPATIENT)
Dept: FAMILY MEDICINE | Facility: OTHER | Age: 29
End: 2024-11-01
Attending: STUDENT IN AN ORGANIZED HEALTH CARE EDUCATION/TRAINING PROGRAM
Payer: COMMERCIAL

## 2024-11-01 VITALS
TEMPERATURE: 99.1 F | OXYGEN SATURATION: 100 % | SYSTOLIC BLOOD PRESSURE: 102 MMHG | WEIGHT: 191.8 LBS | BODY MASS INDEX: 31.96 KG/M2 | DIASTOLIC BLOOD PRESSURE: 68 MMHG | HEIGHT: 65 IN | HEART RATE: 82 BPM

## 2024-11-01 DIAGNOSIS — L01.00 IMPETIGO: Primary | ICD-10-CM

## 2024-11-01 DIAGNOSIS — G43.119 INTRACTABLE MIGRAINE WITH AURA WITHOUT STATUS MIGRAINOSUS: ICD-10-CM

## 2024-11-01 PROCEDURE — 99213 OFFICE O/P EST LOW 20 MIN: CPT | Performed by: STUDENT IN AN ORGANIZED HEALTH CARE EDUCATION/TRAINING PROGRAM

## 2024-11-01 PROCEDURE — G2211 COMPLEX E/M VISIT ADD ON: HCPCS | Performed by: STUDENT IN AN ORGANIZED HEALTH CARE EDUCATION/TRAINING PROGRAM

## 2024-11-01 PROCEDURE — G0463 HOSPITAL OUTPT CLINIC VISIT: HCPCS

## 2024-11-01 RX ORDER — IBUPROFEN 800 MG/1
800 TABLET, FILM COATED ORAL EVERY 8 HOURS PRN
Qty: 90 TABLET | Refills: 1 | Status: SHIPPED | OUTPATIENT
Start: 2024-11-01

## 2024-11-01 RX ORDER — MUPIROCIN 20 MG/G
OINTMENT TOPICAL 3 TIMES DAILY
Qty: 30 G | Refills: 0 | Status: SHIPPED | OUTPATIENT
Start: 2024-11-01

## 2024-11-01 ASSESSMENT — PAIN SCALES - GENERAL: PAINLEVEL_OUTOF10: NO PAIN (0)

## 2024-11-01 NOTE — PROGRESS NOTES
Assessment & Plan     Impetigo  Impetigo, likely started with reaction to her facial cream, developed into impetigo with scratching.  As local, will treat with topical mupirocin.  No indication for systemic antibiotics.    Reviewed good hand hygiene.  Covering active lesions.  highly contagious and needs to minimize spread.   Discussed if worsening, not improving, or spreading will need systemic antibiotics and she would reach out.  Reviewed signs or symptoms that indicate need for urgent follow-up.  - mupirocin (BACTROBAN) 2 % external ointment; Apply topically 3 times daily. Apply pea sized amount up to 3x/day. Do for 5-7 days.    Intractable migraine with aura without status migrainosus  Refilled  - ibuprofen (ADVIL/MOTRIN) 800 MG tablet; Take 1 tablet (800 mg) by mouth every 8 hours as needed for moderate pain or inflammatory pain (migraines). Do not take with naproxen.        The longitudinal plan of care for the diagnosis(es)/condition(s) as documented were addressed during this visit. Due to the added complexity in care, I will continue to support Tasneem in the subsequent management and with ongoing continuity of care.    Subjective   Tasneem is a 29 year old, presenting for the following health issues:  Derm Problem        11/1/2024     4:19 PM   Additional Questions   Roomed by Isa WALKER   Accompanied by self     History of Present Illness       Reason for visit:  Impetigo?   She is taking medications regularly.       Concern - possible impetigo   Onset: about a week ago   Description: left side of chin, bleeding a little bit, some scabbing   Intensity: mild  Progression of Symptoms:  same  Accompanying Signs & Symptoms: scabbing, bleeding some pain, chin area feels like it gets really hot at times  Previous history of similar problem: yes but has always been above top lip   Precipitating factors:        Worsened by: washing face   Alleviating factors:        Improved by: none   Therapies tried and  "outcome: OT allergy medicine     Ponds lotion - first time using it in AM  Burned initially. Could feel something coming, was pruritic. Next morning it was there.   A few other spots popped up over same time.   Spot on face feels, hot, not going away.  Yellow crust on it.  Youngest has spots on his that are red. Unsure if crust.          Objective    /68 (BP Location: Left arm, Patient Position: Sitting, Cuff Size: Adult Large)   Pulse 82   Temp 99.1  F (37.3  C) (Tympanic)   Ht 1.651 m (5' 5\")   Wt 87 kg (191 lb 12.8 oz)   LMP 08/31/2023 (Approximate)   SpO2 100%   BMI 31.92 kg/m    Body mass index is 31.92 kg/m .    Physical Exam  Constitutional:       General: She is not in acute distress.     Appearance: Normal appearance. She is not ill-appearing.   Pulmonary:      Effort: Pulmonary effort is normal.   Skin:     Comments: On her left lower jaw there is a 1 inch erythematous area with numerous spots of honey colored crust.  No drainage.  There are a few other surrounding pinpoint papules over her chin.  No facial swelling.  No lesions over her arms or the rest of her face.   Neurological:      Mental Status: She is alert.                    Signed Electronically by: Nirali Stephens MD    "

## 2024-11-04 ENCOUNTER — TELEPHONE (OUTPATIENT)
Dept: PEDIATRICS | Facility: OTHER | Age: 29
End: 2024-11-04

## 2024-11-04 DIAGNOSIS — L01.00 IMPETIGO: Primary | ICD-10-CM

## 2024-11-04 RX ORDER — AZITHROMYCIN 250 MG/1
TABLET, FILM COATED ORAL
Qty: 6 TABLET | Refills: 0 | Status: SHIPPED | OUTPATIENT
Start: 2024-11-04

## 2024-11-16 SDOH — HEALTH STABILITY: PHYSICAL HEALTH: ON AVERAGE, HOW MANY DAYS PER WEEK DO YOU ENGAGE IN MODERATE TO STRENUOUS EXERCISE (LIKE A BRISK WALK)?: 5 DAYS

## 2024-11-16 SDOH — HEALTH STABILITY: PHYSICAL HEALTH: ON AVERAGE, HOW MANY MINUTES DO YOU ENGAGE IN EXERCISE AT THIS LEVEL?: 20 MIN

## 2024-11-16 ASSESSMENT — SOCIAL DETERMINANTS OF HEALTH (SDOH): HOW OFTEN DO YOU GET TOGETHER WITH FRIENDS OR RELATIVES?: PATIENT DECLINED

## 2024-11-19 ENCOUNTER — OFFICE VISIT (OUTPATIENT)
Dept: FAMILY MEDICINE | Facility: OTHER | Age: 29
End: 2024-11-19
Attending: STUDENT IN AN ORGANIZED HEALTH CARE EDUCATION/TRAINING PROGRAM
Payer: COMMERCIAL

## 2024-11-19 VITALS
HEIGHT: 63 IN | TEMPERATURE: 97.7 F | BODY MASS INDEX: 33.54 KG/M2 | SYSTOLIC BLOOD PRESSURE: 104 MMHG | DIASTOLIC BLOOD PRESSURE: 70 MMHG | WEIGHT: 189.3 LBS | HEART RATE: 80 BPM | OXYGEN SATURATION: 98 %

## 2024-11-19 DIAGNOSIS — Z00.00 ROUTINE GENERAL MEDICAL EXAMINATION AT A HEALTH CARE FACILITY: Primary | ICD-10-CM

## 2024-11-19 DIAGNOSIS — B00.2 ORAL HERPES: ICD-10-CM

## 2024-11-19 DIAGNOSIS — Z13.220 SCREENING FOR LIPID DISORDERS: ICD-10-CM

## 2024-11-19 DIAGNOSIS — E66.811 CLASS 1 OBESITY DUE TO EXCESS CALORIES WITHOUT SERIOUS COMORBIDITY WITH BODY MASS INDEX (BMI) OF 33.0 TO 33.9 IN ADULT: ICD-10-CM

## 2024-11-19 DIAGNOSIS — E66.09 CLASS 1 OBESITY DUE TO EXCESS CALORIES WITHOUT SERIOUS COMORBIDITY WITH BODY MASS INDEX (BMI) OF 33.0 TO 33.9 IN ADULT: ICD-10-CM

## 2024-11-19 DIAGNOSIS — Z90.710 S/P HYSTERECTOMY: ICD-10-CM

## 2024-11-19 LAB
ALBUMIN SERPL BCG-MCNC: 4.6 G/DL (ref 3.5–5.2)
ALP SERPL-CCNC: 81 U/L (ref 40–150)
ALT SERPL W P-5'-P-CCNC: 16 U/L (ref 0–50)
AST SERPL W P-5'-P-CCNC: 17 U/L (ref 0–45)
BILIRUB DIRECT SERPL-MCNC: <0.2 MG/DL (ref 0–0.3)
BILIRUB SERPL-MCNC: 0.3 MG/DL
CHOLEST SERPL-MCNC: 155 MG/DL
FASTING STATUS PATIENT QL REPORTED: NORMAL
FASTING STATUS PATIENT QL REPORTED: NORMAL
GLUCOSE SERPL-MCNC: 99 MG/DL (ref 70–99)
HDLC SERPL-MCNC: 53 MG/DL
LDLC SERPL CALC-MCNC: 92 MG/DL
NONHDLC SERPL-MCNC: 102 MG/DL
PROT SERPL-MCNC: 7.4 G/DL (ref 6.4–8.3)
TRIGL SERPL-MCNC: 48 MG/DL
TSH SERPL DL<=0.005 MIU/L-ACNC: 1.79 UIU/ML (ref 0.3–4.2)

## 2024-11-19 PROCEDURE — G0463 HOSPITAL OUTPT CLINIC VISIT: HCPCS

## 2024-11-19 PROCEDURE — 80061 LIPID PANEL: CPT | Mod: ZL | Performed by: STUDENT IN AN ORGANIZED HEALTH CARE EDUCATION/TRAINING PROGRAM

## 2024-11-19 PROCEDURE — 82306 VITAMIN D 25 HYDROXY: CPT | Mod: ZL | Performed by: STUDENT IN AN ORGANIZED HEALTH CARE EDUCATION/TRAINING PROGRAM

## 2024-11-19 PROCEDURE — 83525 ASSAY OF INSULIN: CPT | Mod: ZL | Performed by: STUDENT IN AN ORGANIZED HEALTH CARE EDUCATION/TRAINING PROGRAM

## 2024-11-19 PROCEDURE — 82947 ASSAY GLUCOSE BLOOD QUANT: CPT | Mod: ZL | Performed by: STUDENT IN AN ORGANIZED HEALTH CARE EDUCATION/TRAINING PROGRAM

## 2024-11-19 PROCEDURE — 84443 ASSAY THYROID STIM HORMONE: CPT | Mod: ZL | Performed by: STUDENT IN AN ORGANIZED HEALTH CARE EDUCATION/TRAINING PROGRAM

## 2024-11-19 PROCEDURE — 82248 BILIRUBIN DIRECT: CPT | Mod: ZL | Performed by: STUDENT IN AN ORGANIZED HEALTH CARE EDUCATION/TRAINING PROGRAM

## 2024-11-19 PROCEDURE — 82040 ASSAY OF SERUM ALBUMIN: CPT | Mod: ZL | Performed by: STUDENT IN AN ORGANIZED HEALTH CARE EDUCATION/TRAINING PROGRAM

## 2024-11-19 PROCEDURE — 36415 COLL VENOUS BLD VENIPUNCTURE: CPT | Mod: ZL | Performed by: STUDENT IN AN ORGANIZED HEALTH CARE EDUCATION/TRAINING PROGRAM

## 2024-11-19 RX ORDER — VALACYCLOVIR HYDROCHLORIDE 1 G/1
1000 TABLET, FILM COATED ORAL 2 TIMES DAILY
Qty: 6 TABLET | Refills: 0 | Status: SHIPPED | OUTPATIENT
Start: 2024-11-19

## 2024-11-19 ASSESSMENT — ANXIETY QUESTIONNAIRES
GAD7 TOTAL SCORE: 1
IF YOU CHECKED OFF ANY PROBLEMS ON THIS QUESTIONNAIRE, HOW DIFFICULT HAVE THESE PROBLEMS MADE IT FOR YOU TO DO YOUR WORK, TAKE CARE OF THINGS AT HOME, OR GET ALONG WITH OTHER PEOPLE: SOMEWHAT DIFFICULT
3. WORRYING TOO MUCH ABOUT DIFFERENT THINGS: NOT AT ALL
2. NOT BEING ABLE TO STOP OR CONTROL WORRYING: NOT AT ALL
5. BEING SO RESTLESS THAT IT IS HARD TO SIT STILL: NOT AT ALL
7. FEELING AFRAID AS IF SOMETHING AWFUL MIGHT HAPPEN: NOT AT ALL
8. IF YOU CHECKED OFF ANY PROBLEMS, HOW DIFFICULT HAVE THESE MADE IT FOR YOU TO DO YOUR WORK, TAKE CARE OF THINGS AT HOME, OR GET ALONG WITH OTHER PEOPLE?: SOMEWHAT DIFFICULT
6. BECOMING EASILY ANNOYED OR IRRITABLE: NOT AT ALL
GAD7 TOTAL SCORE: 1
4. TROUBLE RELAXING: NOT AT ALL
1. FEELING NERVOUS, ANXIOUS, OR ON EDGE: SEVERAL DAYS
GAD7 TOTAL SCORE: 1
7. FEELING AFRAID AS IF SOMETHING AWFUL MIGHT HAPPEN: NOT AT ALL

## 2024-11-19 ASSESSMENT — PAIN SCALES - GENERAL: PAINLEVEL_OUTOF10: NO PAIN (0)

## 2024-11-19 NOTE — PATIENT INSTRUCTIONS
Patient Education   Preventive Care Advice   This is general advice given by our system to help you stay healthy. However, your care team may have specific advice just for you. Please talk to your care team about your preventive care needs.  Nutrition  Eat 5 or more servings of fruits and vegetables each day.  Try wheat bread, brown rice and whole grain pasta (instead of white bread, rice, and pasta).  Get enough calcium and vitamin D. Check the label on foods and aim for 100% of the RDA (recommended daily allowance).  Lifestyle  Exercise at least 150 minutes each week  (30 minutes a day, 5 days a week).  Do muscle strengthening activities 2 days a week. These help control your weight and prevent disease.  No smoking.  Wear sunscreen to prevent skin cancer.  Have a dental exam and cleaning every 6 months.  Yearly exams  See your health care team every year to talk about:  Any changes in your health.  Any medicines your care team has prescribed.  Preventive care, family planning, and ways to prevent chronic diseases.  Shots (vaccines)   HPV shots (up to age 26), if you've never had them before.  Hepatitis B shots (up to age 59), if you've never had them before.  COVID-19 shot: Get this shot when it's due.  Flu shot: Get a flu shot every year.  Tetanus shot: Get a tetanus shot every 10 years.  Pneumococcal, hepatitis A, and RSV shots: Ask your care team if you need these based on your risk.  Shingles shot (for age 50 and up)  General health tests  Diabetes screening:  Starting at age 35, Get screened for diabetes at least every 3 years.  If you are younger than age 35, ask your care team if you should be screened for diabetes.  Cholesterol test: At age 39, start having a cholesterol test every 5 years, or more often if advised.  Bone density scan (DEXA): At age 50, ask your care team if you should have this scan for osteoporosis (brittle bones).  Hepatitis C: Get tested at least once in your life.  STIs (sexually  transmitted infections)  Before age 24: Ask your care team if you should be screened for STIs.  After age 24: Get screened for STIs if you're at risk. You are at risk for STIs (including HIV) if:  You are sexually active with more than one person.  You don't use condoms every time.  You or a partner was diagnosed with a sexually transmitted infection.  If you are at risk for HIV, ask about PrEP medicine to prevent HIV.  Get tested for HIV at least once in your life, whether you are at risk for HIV or not.  Cancer screening tests  Cervical cancer screening: If you have a cervix, begin getting regular cervical cancer screening tests starting at age 21.  Breast cancer scan (mammogram): If you've ever had breasts, begin having regular mammograms starting at age 40. This is a scan to check for breast cancer.  Colon cancer screening: It is important to start screening for colon cancer at age 45.  Have a colonoscopy test every 10 years (or more often if you're at risk) Or, ask your provider about stool tests like a FIT test every year or Cologuard test every 3 years.  To learn more about your testing options, visit:   .  For help making a decision, visit:   https://bit.ly/er02398.  Prostate cancer screening test: If you have a prostate, ask your care team if a prostate cancer screening test (PSA) at age 55 is right for you.  Lung cancer screening: If you are a current or former smoker ages 50 to 80, ask your care team if ongoing lung cancer screenings are right for you.  For informational purposes only. Not to replace the advice of your health care provider. Copyright   2023 [employer]. All rights reserved. Clinically reviewed by the [facility] Transitions Program. [employer] 625591 - REV 01/24.

## 2024-11-19 NOTE — PROGRESS NOTES
Preventive Care Visit  [facility]  Nirali Stephens MD, Family Medicine  Nov 19, 2024      Assessment & Plan     Routine general medical examination at a health care facility  Healthcare maintenance:   - PAP: s/p vaginal hysterectomy 2023  - Birth control: s/p vaginal hysterectomy 2023  - Hemoglobin: within normal limits last year   - Glucose screen: normal 11/19/24  - Lipid screen: normal 11/19/24  - Immunizations: declines covid and flu     Oral herpes  Stable, worse in winter with flares. LFTs within normal limits. Continue as needed valtrex.   - valACYclovir (VALTREX) 1000 mg tablet; Take 1 tablet (1,000 mg) by mouth 2 times daily. Only take for one day with each flare of cold sores.    S/P hysterectomy  Noted. No need for pap.     Class 1 obesity due to excess calories without serious comorbidity with body mass index (BMI) of 33.0 to 33.9 in adult  BMI 33. Discussion on weight loss strategies, monitoring foods, use of Weight Watchers. Should add in more cardio and 2 days/week strength training. Reviewed medication options. Considered metformin but glucose and insulin testing within normal limits. Declines 2h glucose test at this time. Will plan follow up with weight recheck in six weeks. If minimal change with intensive diet and exercise change, consider alternative medication - GLP, phentermine, Wellbutrin/naltrexone.   - Glucose; Future  - Insulin level; Future  - TSH with free T4 reflex; Future  - Hepatic panel (Albumin, ALT, AST, Bili, Alk Phos, TP); Future  - Lipid Profile (Chol, Trig, HDL, LDL calc); Future  - Vitamin D Deficiency; Future  - Glucose  - Insulin level  - TSH with free T4 reflex  - Hepatic panel (Albumin, ALT, AST, Bili, Alk Phos, TP)  - Lipid Profile (Chol, Trig, HDL, LDL calc)  - Vitamin D Deficiency    Screening for lipid disorders  - Lipid Profile (Chol, Trig, HDL, LDL calc); Future  - Lipid Profile (Chol, Trig, HDL, LDL calc)        BMI  Estimated body mass index is 33.09  "kg/m  as calculated from the following:    Height as of this encounter: 1.611 m (5' 3.43\").    Weight as of this encounter: 85.9 kg (189 lb 4.8 oz).   Weight management plan: Discussed healthy diet and exercise guidelines    Counseling  Appropriate preventive services were addressed with this patient via screening, questionnaire, or discussion as appropriate for fall prevention, nutrition, physical activity, Tobacco-use cessation, social engagement, weight loss and cognition.  Checklist reviewing preventive services available has been given to the patient.  Reviewed patient's diet, addressing concerns and/or questions.   The patient was instructed to see the dentist every 6 months.         Return in about 1 year (around 11/20/2025) for Preventive Visit.    Diogenes Boateng is a 29 year old, presenting for the following:  Physical        11/19/2024     9:49 AM   Additional Questions   Roomed by Bib Rodriguez   Accompanied by Self         11/19/2024     9:49 AM   Patient Reported Additional Medications   Patient reports taking the following new medications none          HPI  Healthcare maintenance:   - PAP: s/p vaginal hysterectomy 2023  - Birth control: s/p vaginal hysterectomy 2023  - Hemoglobin: within normal limits last year   - Glucose screen: normal 11/19/24  - Lipid screen: normal 11/19/24  - Immunizations: declines covid and flu     WEIGHT   Weight started to increase in June. Was eating more. No new medications.   Current weight: 189 lbs 4.8 oz  191lbs is the heaviest she's been   Weight goal: 130-140lbs    DIET HISTORY  Breakfast: skips  Lunch: chicken nuggets prior; now protein based; cut out bread, cut out noodles  Dinner: similar to lunch now - veggies, fruits; avoiding quick processed; doing smaller portions.   Snacks: kind protein, peanuts   Drinks: water   No alcohol     Exercise: started exercise bike 10min/day.     Possible kidney stones on imaging in the past.       Health Care Directive  Patient " does not have a Health Care Directive: Discussed advance care planning with patient; however, patient declined at this time.      11/16/2024   General Health   How would you rate your overall physical health? Good   Feel stress (tense, anxious, or unable to sleep) Only a little      (!) STRESS CONCERN      11/16/2024   Nutrition   Three or more servings of calcium each day? Yes   Diet: I don't know   How many servings of fruit and vegetables per day? (!) 0-1   How many sweetened beverages each day? 0-1            11/16/2024   Exercise   Days per week of moderate/strenous exercise 5 days   Average minutes spent exercising at this level 20 min            11/16/2024   Social Factors   Frequency of gathering with friends or relatives Patient declined   Worry food won't last until get money to buy more No   Food not last or not have enough money for food? No   Do you have housing? (Housing is defined as stable permanent housing and does not include staying ouside in a car, in a tent, in an abandoned building, in an overnight shelter, or couch-surfing.) Yes   Are you worried about losing your housing? No   Lack of transportation? No   Unable to get utilities (heat,electricity)? No            11/16/2024   Dental   Dentist two times every year? (!) NO            11/16/2024   TB Screening   Were you born outside of the [place]? No          Today's PHQ-9 Score:       11/19/2024     9:47 AM   PHQ-9 SCORE   PHQ-9 Total Score MyChart 0   PHQ-9 Total Score 0        Patient-reported         11/16/2024   Substance Use   Alcohol more than 3/day or more than 7/wk No   Do you use any other substances recreationally? No        Social History     Tobacco Use    Smoking status: Never     Passive exposure: Current    Smokeless tobacco: Never   Vaping Use    Vaping status: Never Used   Substance Use Topics    Alcohol use: Not Currently     Comment: rare    Drug use: Never             11/16/2024   One time HIV Screening   Previous HIV test? No  "         11/16/2024   STI Screening   New sexual partner(s) since last STI/HIV test? No        History of abnormal Pap smear: Status post hysterectomy with removal of cervix and no history of CIN2 or greater or cervical cancer. Health Maintenance and Surgical History updated.        5/12/2023    11:24 AM   PAP / HPV   PAP Negative for Intraepithelial Lesion or Malignancy (NILM)        Reviewed and updated as needed this visit by Provider   Tobacco     Med Hx  Surg Hx  Fam Hx  Soc Hx Sexual Activity                 Objective    Exam  /70 (BP Location: Right arm, Patient Position: Sitting, Cuff Size: Adult Regular)   Pulse 80   Temp 97.7  F (36.5  C) (Tympanic)   Ht 1.611 m (5' 3.43\")   Wt 85.9 kg (189 lb 4.8 oz)   LMP 08/31/2023 (Approximate)   SpO2 98%   BMI 33.09 kg/m     Estimated body mass index is 33.09 kg/m  as calculated from the following:    Height as of this encounter: 1.611 m (5' 3.43\").    Weight as of this encounter: 85.9 kg (189 lb 4.8 oz).    Physical Exam  Constitutional:       General: She is not in acute distress.     Appearance: Normal appearance. She is well-developed. She is not ill-appearing.   HENT:      Head: Normocephalic and atraumatic.      Right Ear: Tympanic membrane and external ear normal.      Left Ear: Tympanic membrane and external ear normal.      Nose: Nose normal.      Mouth/Throat:      Mouth: Mucous membranes are moist.      Pharynx: No oropharyngeal exudate.   Eyes:      Extraocular Movements: Extraocular movements intact.      Conjunctiva/sclera: Conjunctivae normal.   Neck:      Thyroid: No thyroid mass, thyromegaly or thyroid tenderness.   Cardiovascular:      Rate and Rhythm: Normal rate and regular rhythm.      Pulses: Normal pulses.      Heart sounds: Normal heart sounds, S1 normal and S2 normal. No murmur heard.  Pulmonary:      Effort: Pulmonary effort is normal. No respiratory distress.      Breath sounds: Normal breath sounds. No wheezing, rhonchi or " rales.   Chest:   Breasts:     Right: Normal. No inverted nipple, mass, skin change or tenderness.      Left: Normal. No inverted nipple, mass, skin change or tenderness.   Abdominal:      General: Bowel sounds are normal. There is no abdominal bruit.      Palpations: Abdomen is soft. There is no mass or pulsatile mass.      Tenderness: There is no abdominal tenderness.   Musculoskeletal:         General: Normal range of motion.      Cervical back: Neck supple.      Right lower leg: No edema.      Left lower leg: No edema.   Lymphadenopathy:      Cervical: No cervical adenopathy.      Upper Body:      Right upper body: No supraclavicular or axillary adenopathy.      Left upper body: No supraclavicular or axillary adenopathy.   Skin:     General: Skin is warm and dry.      Capillary Refill: Capillary refill takes less than 2 seconds.      Findings: No rash.   Neurological:      Mental Status: She is alert and oriented to person, place, and time.      Gait: Gait is intact.   Psychiatric:         Attention and Perception: Attention normal.         Mood and Affect: Mood normal.         Speech: Speech normal.         Behavior: Behavior normal.         Thought Content: Thought content normal.         Cognition and Memory: Cognition normal.         Judgment: Judgment normal.           Results for orders placed or performed in visit on 11/19/24   Glucose     Status: None   Result Value Ref Range    Glucose 99 70 - 99 mg/dL    Patient Fasting > 8hrs? Unknown    Insulin level     Status: Normal   Result Value Ref Range    Insulin 7.2 2.6 - 24.9 uU/mL   TSH with free T4 reflex     Status: Normal   Result Value Ref Range    TSH 1.79 0.30 - 4.20 uIU/mL   Hepatic panel (Albumin, ALT, AST, Bili, Alk Phos, TP)     Status: Normal   Result Value Ref Range    Protein Total 7.4 6.4 - 8.3 g/dL    Albumin 4.6 3.5 - 5.2 g/dL    Bilirubin Total 0.3 <=1.2 mg/dL    Alkaline Phosphatase 81 40 - 150 U/L    AST 17 0 - 45 U/L    ALT 16 0 - 50  U/L    Bilirubin Direct <0.20 0.00 - 0.30 mg/dL   Lipid Profile (Chol, Trig, HDL, LDL calc)     Status: None   Result Value Ref Range    Cholesterol 155 <200 mg/dL    Triglycerides 48 <150 mg/dL    Direct Measure HDL 53 >=50 mg/dL    LDL Cholesterol Calculated 92 <100 mg/dL    Non HDL Cholesterol 102 <130 mg/dL    Patient Fasting > 8hrs? Unknown     Narrative    Cholesterol  Desirable: < 200 mg/dL  Borderline High: 200 - 239 mg/dL  High: >= 240 mg/dL    Triglycerides  Normal: < 150 mg/dL  Borderline High: 150 - 199 mg/dL  High: 200-499 mg/dL  Very High: >= 500 mg/dL    Direct Measure HDL  Female: >= 50 mg/dL   Male: >= 40 mg/dL    LDL Cholesterol  Desirable: < 100 mg/dL  Above Desirable: 100 - 129 mg/dL   Borderline High: 130 - 159 mg/dL   High:  160 - 189 mg/dL   Very High: >= 190 mg/dL    Non HDL Cholesterol  Desirable: < 130 mg/dL  Above Desirable: 130 - 159 mg/dL  Borderline High: 160 - 189 mg/dL  High: 190 - 219 mg/dL  Very High: >= 220 mg/dL   Vitamin D Deficiency     Status: Normal   Result Value Ref Range    Vitamin D, Total (25-Hydroxy) 20 20 - 50 ng/mL    Narrative    Season, race, dietary intake, and treatment affect the concentration of 25-hydroxy-Vitamin D. Values may decrease during winter months and increase during summer months.    Vitamin D determination is routinely performed by an immunoassay specific for 25 hydroxyvitamin D3.  If an individual is on vitamin D2(ergocalciferol) supplementation, please specify 25 OH vitamin D2 and D3 level determination by LCMSMS test [id number].           Signed Electronically by: Nirali Stephens MD    Answers submitted by the patient for this visit:  Patient Health Questionnaire (Submitted on 11/19/2024)  If you checked off any problems, how difficult have these problems made it for you to do your work, take care of things at home, or get along with other people?: Not difficult at all  PHQ9 TOTAL SCORE: 0  Patient Health Questionnaire (G7) (Submitted on  11/19/2024)  BALAJI 7 TOTAL SCORE: 1

## 2024-11-20 DIAGNOSIS — E55.9 VITAMIN D INSUFFICIENCY: Primary | ICD-10-CM

## 2024-11-20 LAB
INSULIN SERPL-ACNC: 7.2 UU/ML (ref 2.6–24.9)
VIT D+METAB SERPL-MCNC: 20 NG/ML (ref 20–50)

## 2024-12-02 ENCOUNTER — TELEPHONE (OUTPATIENT)
Dept: FAMILY MEDICINE | Facility: OTHER | Age: 29
End: 2024-12-02

## 2024-12-02 NOTE — TELEPHONE ENCOUNTER
4:16 PM    Reason for Call: OVERBOOK    Patient is having the following symptoms: Patient needs to be seen for yeast infection or UTI days.    The patient is requesting an appointment for Overbook with .    Was an appointment offered for this call? No  If yes : Appointment type              Date    Preferred method for responding to this message: Telephone Call  What is your phone number ?  [phone]    If we cannot reach you directly, may we leave a detailed response at the number you provided? Yes    Can this message wait until your PCP/provider returns, if unavailable today? Provider is in    Anamika Booker

## 2024-12-03 ENCOUNTER — OFFICE VISIT (OUTPATIENT)
Dept: FAMILY MEDICINE | Facility: OTHER | Age: 29
End: 2024-12-03
Attending: STUDENT IN AN ORGANIZED HEALTH CARE EDUCATION/TRAINING PROGRAM
Payer: COMMERCIAL

## 2024-12-03 VITALS
BODY MASS INDEX: 33.14 KG/M2 | TEMPERATURE: 99.4 F | HEART RATE: 76 BPM | OXYGEN SATURATION: 98 % | RESPIRATION RATE: 16 BRPM | SYSTOLIC BLOOD PRESSURE: 110 MMHG | WEIGHT: 187.06 LBS | HEIGHT: 63 IN | DIASTOLIC BLOOD PRESSURE: 74 MMHG

## 2024-12-03 DIAGNOSIS — L67.9 ABNORMALITY OF HAIR GROWTH: ICD-10-CM

## 2024-12-03 DIAGNOSIS — N89.8 VAGINAL ITCHING: ICD-10-CM

## 2024-12-03 DIAGNOSIS — R30.0 DYSURIA: Primary | ICD-10-CM

## 2024-12-03 LAB
ALBUMIN UR-MCNC: NEGATIVE MG/DL
APPEARANCE UR: CLEAR
BACTERIA #/AREA URNS HPF: ABNORMAL /HPF
BACTERIAL VAGINOSIS VAG-IMP: NEGATIVE
BILIRUB UR QL STRIP: NEGATIVE
CANDIDA DNA VAG QL NAA+PROBE: NOT DETECTED
CANDIDA GLABRATA / CANDIDA KRUSEI DNA: NOT DETECTED
COLOR UR AUTO: ABNORMAL
GLUCOSE UR STRIP-MCNC: NEGATIVE MG/DL
HGB UR QL STRIP: NEGATIVE
KETONES UR STRIP-MCNC: NEGATIVE MG/DL
LEUKOCYTE ESTERASE UR QL STRIP: NEGATIVE
NITRATE UR QL: NEGATIVE
PH UR STRIP: 7 [PH] (ref 4.7–8)
RBC URINE: 0 /HPF
SP GR UR STRIP: 1.01 (ref 1–1.03)
SQUAMOUS EPITHELIAL: 1 /HPF
T VAGINALIS DNA VAG QL NAA+PROBE: NOT DETECTED
UROBILINOGEN UR STRIP-MCNC: NORMAL MG/DL
WBC URINE: 0 /HPF

## 2024-12-03 PROCEDURE — 81003 URINALYSIS AUTO W/O SCOPE: CPT | Mod: ZL | Performed by: STUDENT IN AN ORGANIZED HEALTH CARE EDUCATION/TRAINING PROGRAM

## 2024-12-03 PROCEDURE — 0352U MULTIPLEX VAGINAL PANEL BY PCR: CPT | Mod: ZL | Performed by: STUDENT IN AN ORGANIZED HEALTH CARE EDUCATION/TRAINING PROGRAM

## 2024-12-03 ASSESSMENT — PAIN SCALES - GENERAL: PAINLEVEL_OUTOF10: NO PAIN (0)

## 2024-12-03 NOTE — PROGRESS NOTES
Assessment & Plan     Dysuria  Reassured urine negative today and no signs of infection.  Symptoms of dysuria had resolved a few days ago after increasing hydration.  - UA with Microscopic reflex to Culture - [place]; Future  - UA with Microscopic reflex to Culture - [place]    Vaginal itching  Started after developing dysuria.  Question if there was some vulvovaginal irritation at the time.  Has improved.  No concerns on exam.  No lesions, vulvar sores, or discharge.  Multiplex negative.  Discussed vulvar hygiene.  Can utilize Vaseline or Aquaphor for itching.  Try to avoid scrubbing or using soap in the shower.  Follow-up if worsening or not improving.  - Multiplex Vaginal Panel by PCR; Future  - Multiplex Vaginal Panel by PCR    Abnormal hair growth  Suspect related to hormone changes with hysterectomy last year.   Not progressive, no significant acne or other s/symptoms.   Consider testosterone testing at follow up to ensure normal and not significantly elevated.   Will check with function medicine doctor on supplement. I am not certain what this would be. Reviewed spironolactone is a medication often used for this.       The longitudinal plan of care for the diagnosis(es)/condition(s) as documented were addressed during this visit. Due to the added complexity in care, I will continue to support Tasneem in the subsequent management and with ongoing continuity of care.    Diogenes Boateng is a 29 year old, presenting for the following health issues:  Vaginal Problem        12/3/2024     2:18 PM   Additional Questions   Roomed by Lavern Kruse   Accompanied by None         12/3/2024     2:18 PM   Patient Reported Additional Medications   Patient reports taking the following new medications None     History of Present Illness       Reason for visit:  Possible Uti  Symptom onset:  1-3 days ago  Symptoms include:  Pain, burn/stings to pee, itchy,  Symptom intensity:  Moderate  Symptom progression:  Staying  "the same  Had these symptoms before:  No   She is taking medications regularly.       Genitourinary - Female  Onset/Duration: last Wednesday   Description:   Painful urination (Dysuria): YES- prior but is not having painful urination anymore            Frequency: No  Blood in urine (Hematuria): No  Delay in urine (Hesitency): No  Progression of Symptoms:  improving  Accompanying Signs & Symptoms:  Fever/chills: No  Flank pain: No  Nausea and vomiting: No  Vaginal symptoms: itching  Abdominal/Pelvic Pain: No  History:   History of frequent UTI s: YES  History of kidney stones: YES  Sexually Active: YES  Possibility of pregnancy: No  Precipitating or alleviating factors: None  Therapies tried and outcome: None, increased fluid intake     Burning last Wednesday.   Became pruritic. No burning or pain.   No discharge. No swelling. Did think it was swollen last week.   No creams in vaginal area. Washing with soap down there - was doing warm water prior.   Overall feels it is getting better now.   Itching right now just after urination     Mentions that since her hysterectomy for endometriosis one year ago, has noted more facial hair, a few hairs on abdominal area near umbilicus and a few hairs on breast. They have not increased significantly. No acne. No hot flashes or night sweats. Did gain weight but is successfully losing weight with diet and exercise changes. Friend was started on a supplement for something similar and she is wondering about a supplement.         Objective    /74 (BP Location: Right arm, Patient Position: Sitting, Cuff Size: Adult Regular)   Pulse 76   Temp 99.4  F (37.4  C) (Tympanic)   Resp 16   Ht 1.611 m (5' 3.43\")   Wt 84.9 kg (187 lb 1 oz)   LMP 08/31/2023 (Approximate)   SpO2 98%   BMI 32.69 kg/m    Body mass index is 32.69 kg/m .  Physical Exam  Constitutional:       General: She is not in acute distress.     Appearance: Normal appearance. She is not ill-appearing, " toxic-appearing or diaphoretic.   Eyes:      Conjunctiva/sclera: Conjunctivae normal.   Cardiovascular:      Rate and Rhythm: Normal rate and regular rhythm.      Heart sounds: No murmur heard.  Pulmonary:      Effort: Pulmonary effort is normal.      Breath sounds: Normal breath sounds.   Abdominal:      Palpations: Abdomen is soft. There is no mass.      Tenderness: There is no abdominal tenderness. There is no right CVA tenderness, left CVA tenderness, guarding or rebound.   Genitourinary:     General: Normal vulva.      Vagina: Normal.      Comments: No discharge. No external lesions or wounds. Not irritated or inflamed.   Musculoskeletal:      Right lower leg: No edema.      Left lower leg: No edema.   Skin:     Capillary Refill: Capillary refill takes less than 2 seconds.      Comments: No dark terminal hairs near umbilicus or on chin   Neurological:      General: No focal deficit present.      Mental Status: She is alert and oriented to person, place, and time.   Psychiatric:         Mood and Affect: Mood normal.         Behavior: Behavior normal.          Results for orders placed or performed in visit on 12/03/24   UA with Microscopic reflex to Culture - [place]     Status: Abnormal    Specimen: Urine, Clean Catch   Result Value Ref Range    Color Urine Straw Colorless, Straw, Light Yellow, Yellow    Appearance Urine Clear Clear    Glucose Urine Negative Negative mg/dL    Bilirubin Urine Negative Negative    Ketones Urine Negative Negative mg/dL    Specific Gravity Urine 1.008 1.003 - 1.035    Blood Urine Negative Negative    pH Urine 7.0 4.7 - 8.0    Protein Albumin Urine Negative Negative mg/dL    Urobilinogen Urine Normal Normal, 2.0 mg/dL    Nitrite Urine Negative Negative    Leukocyte Esterase Urine Negative Negative    Bacteria Urine Few (A) None Seen /HPF    RBC Urine 0 <=2 /HPF    WBC Urine 0 <=5 /HPF    Squamous Epithelials Urine 1 <=1 /HPF    Narrative    Urine Culture not indicated   Multiplex  Vaginal Panel by PCR     Status: Normal    Specimen: Vagina; Swab   Result Value Ref Range    Bacterial Vaginosis Organism DNA Negative Negative    Candida Group DNA Not Detected Not Detected    Candida glabrata / Mai krusei DNA Not Detected Not Detected    Trichomonas vaginalis DNA Not Detected Not Detected    Narrative    The Xpert  Xpress MVP test, performed on the [employer], is an automated, qualitative in vitro diagnostic test for the detection of DNA targets from anaerobic bacteria associated with bacterial vaginosis, Candida species associated with vulvovaginal candidiasis, and Trichomonas vaginalis. The assay uses clinician-collected and self-collected vaginal swabs from patients who are symptomatic for vaginitis/ vaginosis. The Xpert  Xpress MVP test utilizes real-time polymerase chain reaction (PCR) for the amplification of specific DNA targets and utilizes fluorogenic target-specific hybridization probes to detect and differentiate DNA. It is intended to aid in the diagnosis of vaginal infections in women with a clinical presentation consistent with bacterial vaginosis, vulvovaginal candidiasis, or trichomoniasis.   The assay targets three anaerobic microorgansims that are associated with bacterial vaginosis (BV). Other organisms that are not detected by the Xpert  Xpress MVP test have also been reported to be associated with BV. The BV organism and Candida species targets of the Xpert  Xpress MVP test can be commensal in women; positive results must be considered in conjunction with other clinical and patient information to determine the disease status.             Signed Electronically by: Nirali Stephens MD

## 2024-12-03 NOTE — PATIENT INSTRUCTIONS
"VAGINAL CARE  Wash ONLY with warm water, no soap, no loafas, no bleach, shampoos or bubble baths.   Use mineral oil or vaseline if itching.   Use non-irritating lubricants (avoid scented chemical, anything that has \"extra\" could be irritant)  Avoid shaving or douching  Wear white, wide cotton underwear (breathes better, anytime it is not white it has dyes and additives to fabric)   - wash in very HOT water   - use 1/2 laundry soap, double rinse, do not hand wash   - sleep without underwear, wear loose clothing  Avoid sex 1+ week if symptoms of pain/infection     "

## 2025-02-10 ENCOUNTER — OFFICE VISIT (OUTPATIENT)
Dept: FAMILY MEDICINE | Facility: OTHER | Age: 30
End: 2025-02-10
Attending: STUDENT IN AN ORGANIZED HEALTH CARE EDUCATION/TRAINING PROGRAM
Payer: COMMERCIAL

## 2025-02-10 VITALS
BODY MASS INDEX: 32.66 KG/M2 | DIASTOLIC BLOOD PRESSURE: 68 MMHG | OXYGEN SATURATION: 99 % | RESPIRATION RATE: 16 BRPM | HEIGHT: 63 IN | HEART RATE: 86 BPM | SYSTOLIC BLOOD PRESSURE: 108 MMHG | TEMPERATURE: 98.7 F | WEIGHT: 184.3 LBS

## 2025-02-10 DIAGNOSIS — R10.9 ABDOMINAL WALL PAIN: ICD-10-CM

## 2025-02-10 DIAGNOSIS — R10.13 ABDOMINAL PAIN, EPIGASTRIC: Primary | ICD-10-CM

## 2025-02-10 DIAGNOSIS — F40.298 NEEDLE PHOBIA: ICD-10-CM

## 2025-02-10 PROCEDURE — G0463 HOSPITAL OUTPT CLINIC VISIT: HCPCS

## 2025-02-10 PROCEDURE — G0463 HOSPITAL OUTPT CLINIC VISIT: HCPCS | Mod: 25

## 2025-02-10 PROCEDURE — G2211 COMPLEX E/M VISIT ADD ON: HCPCS | Performed by: STUDENT IN AN ORGANIZED HEALTH CARE EDUCATION/TRAINING PROGRAM

## 2025-02-10 PROCEDURE — 99214 OFFICE O/P EST MOD 30 MIN: CPT | Performed by: STUDENT IN AN ORGANIZED HEALTH CARE EDUCATION/TRAINING PROGRAM

## 2025-02-10 RX ORDER — LORAZEPAM 0.5 MG/1
0.5 TABLET ORAL ONCE
Qty: 1 TABLET | Refills: 0 | Status: SHIPPED | OUTPATIENT
Start: 2025-02-10 | End: 2025-02-10

## 2025-02-10 ASSESSMENT — PAIN SCALES - GENERAL: PAINLEVEL_OUTOF10: NO PAIN (0)

## 2025-02-10 NOTE — PROGRESS NOTES
Assessment & Plan     Abdominal pain, epigastric / Abdominal wall pain  Pain located between the umbilicus and epigastric area, worse with exercise like squats and crunches.  Does not seem associated with food, no nausea or vomiting.  LFTs normal in November.  As pain worsened in December, will update basic labs.  Recommend CT evaluation to rule out hernia or other organic etiology more posterior to the muscles.  Discussed this could be a muscle strain, diastases recti contributing and deconditioning due to pregnancies and lack of exercise in the past.  May need to work on rehabbing her deep core muscles.  If CT reassuring and labs normal, consider referral to physical therapy to help work on this.  She is aware of signs or symptoms that should prompt urgent follow-up.  - CBC with Platelets & Differential; Future  - Comprehensive metabolic panel; Future  - Lipase; Future  - CT Abdomen Pelvis w Contrast; Future    Needle phobia  Severe needle phobia and does not do well with an IV.  Will need an IV with the CT.  Discussed risks and benefits of Ativan.  Her  will drive her.  - LORazepam (ATIVAN) 0.5 MG tablet; Take 1 tablet (0.5 mg) by mouth once for 1 dose. Take 30 min prior to procedure. Must have a .          The longitudinal plan of care for the diagnosis(es)/condition(s) as documented were addressed during this visit. Due to the added complexity in care, I will continue to support Tasneem in the subsequent management and with ongoing continuity of care.    Diogenes Boateng is a 29 year old, presenting for the following health issues:  Musculoskeletal Problem (Pain /)        2/10/2025     1:04 PM   Additional Questions   Roomed by Mary Mendez   Accompanied by self         2/10/2025     1:04 PM   Patient Reported Additional Medications   Patient reports taking the following new medications none     History of Present Illness       Reason for visit:  Pain    She eats 2-3 servings of fruits and  "vegetables daily.She consumes 1 sweetened beverage(s) daily.She exercises with enough effort to increase her heart rate 10 to 19 minutes per day.  She exercises with enough effort to increase her heart rate 5 days per week.   She is taking medications regularly.       Concern - tore abdominal muscle   Onset: 13 years ago, while pregnant   Description: from the pregnancy  Intensity: pain comes and goes   Progression of Symptoms:  intermittent  Accompanying Signs & Symptoms: no  Previous history of similar problem: ongoing  Precipitating factors:        Worsened by: exercising   Alleviating factors:        Improved by: no  Therapies tried and outcome: None    Reports torn muscle in stomach from 10+ years ago. No imaging. Just told her that.   Started exercise in December, that's when it flared up.   Reports diagnosed during a pregnancy.   Right in central abdomen.   Worse with exercise. No issues until recently with exercise.   Has been riding bike, doing jumping jacks, squats, sit ups.   No pain with eating.   Hasn't noticed a bulge.   Can push in that spot and it's tender.   No nausea or vomiting.  No right upper quadrant pain.  Does not get any urinary leakage.        Objective    /68 (BP Location: Left arm, Patient Position: Sitting, Cuff Size: Adult Regular)   Pulse 86   Temp 98.7  F (37.1  C) (Tympanic)   Resp 16   Ht 1.611 m (5' 3.43\")   Wt 83.6 kg (184 lb 4.8 oz)   LMP 08/31/2023 (Approximate)   SpO2 99%   BMI 32.21 kg/m    Body mass index is 32.21 kg/m .    Physical Exam  Constitutional:       General: She is not in acute distress.     Appearance: Normal appearance. She is not ill-appearing.   Abdominal:      General: Bowel sounds are normal.      Palpations: Abdomen is soft.      Tenderness: There is abdominal tenderness.          Comments: Area of tenderness circled.  No palpable bulge.  No bulge with contraction.  Pain is better when she is jacky her abdomen muscles and I am pushing.  " Pain is worse when she is not jacky and I am pushing.  She does have slight diastases recti.  Approximately 1.5 fingertip at muscle separation.   Neurological:      Mental Status: She is alert.   Psychiatric:         Mood and Affect: Mood normal.         Behavior: Behavior normal.                    Signed Electronically by: Nirali Stephens MD

## 2025-03-24 ENCOUNTER — TELEPHONE (OUTPATIENT)
Dept: FAMILY MEDICINE | Facility: OTHER | Age: 30
End: 2025-03-24

## 2025-03-24 ENCOUNTER — OFFICE VISIT (OUTPATIENT)
Dept: FAMILY MEDICINE | Facility: OTHER | Age: 30
End: 2025-03-24
Attending: STUDENT IN AN ORGANIZED HEALTH CARE EDUCATION/TRAINING PROGRAM
Payer: COMMERCIAL

## 2025-03-24 VITALS
SYSTOLIC BLOOD PRESSURE: 105 MMHG | HEART RATE: 95 BPM | TEMPERATURE: 99.4 F | BODY MASS INDEX: 31.64 KG/M2 | HEIGHT: 63 IN | DIASTOLIC BLOOD PRESSURE: 71 MMHG | RESPIRATION RATE: 16 BRPM | OXYGEN SATURATION: 98 % | WEIGHT: 178.56 LBS

## 2025-03-24 DIAGNOSIS — B00.2 ORAL HERPES: ICD-10-CM

## 2025-03-24 DIAGNOSIS — K13.79 MOUTH SORES: Primary | ICD-10-CM

## 2025-03-24 DIAGNOSIS — K04.7 DENTAL INFECTION: ICD-10-CM

## 2025-03-24 PROCEDURE — G0463 HOSPITAL OUTPT CLINIC VISIT: HCPCS

## 2025-03-24 PROCEDURE — 87529 HSV DNA AMP PROBE: CPT | Mod: ZL | Performed by: STUDENT IN AN ORGANIZED HEALTH CARE EDUCATION/TRAINING PROGRAM

## 2025-03-24 RX ORDER — VALACYCLOVIR HYDROCHLORIDE 1 G/1
1000 TABLET, FILM COATED ORAL 2 TIMES DAILY
Qty: 20 TABLET | Refills: 0 | Status: SHIPPED | OUTPATIENT
Start: 2025-03-24

## 2025-03-24 ASSESSMENT — PAIN SCALES - GENERAL: PAINLEVEL_OUTOF10: MODERATE PAIN (4)

## 2025-03-24 NOTE — TELEPHONE ENCOUNTER
8:03 AM    Reason for Call: OVERBOOK    Patient is having the following symptoms: mouth infection  for 1 weeks.    The patient is requesting an appointment for today with Dr. Stephens.    Was an appointment offered for this call? No  If yes : Appointment type              Date    Preferred method for responding to this message: Telephone Call  What is your phone number ? [phone]    If we cannot reach you directly, may we leave a detailed response at the number you provided? Yes    Can this message wait until your PCP/provider returns, if unavailable today? No,     Kandice Graham

## 2025-03-24 NOTE — PROGRESS NOTES
Assessment & Plan     Mouth sores  Appears consistent with oral herpes, which she has had in the past but only presenting as cold sores.  No recent viral infection.  No GI symptoms or other autoimmune symptoms.  No hand-foot and mouth or other features of this.  Did obtain swab of the sores in her mouth.  In the interim, do recommend treating with Valtrex 1000 mg twice daily for 5 days as treating as initial mucocutaneous flare since it is in her oral cavity rather than just a cold sore.  Will await testing.  If negative and sores do not resolve, consider further workup.  - Herpes Simplex Virus 1&2 by PCR; Future  - Herpes Simplex Virus 1&2 by PCR    Oral herpes  See above plan.  Reordering Valtrex.  - valACYclovir (VALTREX) 1000 mg tablet; Take 1 tablet (1,000 mg) by mouth 2 times daily. Only take for one day with each flare of cold sores.    Dental infection  Possibly developing dental infection with some tenderness over the left upper tooth and mild erythema of the gumline.  Reassuring no abscess or face swelling presently.  Will give Augmentin to start if symptoms are worsening tomorrow.  Has done okay on this in the past but has had some nausea.  Discussed probiotic use.  Hopefully can get into a dentist soon.  Has struggled with finding someone to take her insurance.  Insert symptoms.  - amoxicillin-clavulanate (AUGMENTIN) 875-125 MG tablet; Take 1 tablet by mouth 2 times daily for 7 days.      The longitudinal plan of care for the diagnosis(es)/condition(s) as documented were addressed during this visit. Due to the added complexity in care, I will continue to support Tasneem in the subsequent management and with ongoing continuity of care.    Diogenes Boateng is a 29 year old, presenting for the following health issues:  Mouth Problem        3/24/2025     2:21 PM   Additional Questions   Roomed by Lavern Kruse   Accompanied by None         3/24/2025     2:21 PM   Patient Reported Additional  "Medications   Patient reports taking the following new medications None     History of Present Illness       Reason for visit:  Mouth pain   She is taking medications regularly.        Concern - Mouth pain   Onset: about a week ago   Description: pain under the tongue and the roof of the mouth currently, gums were previously swollen ; sores under the tongue and the roof of the mouth   Progression of Symptoms:  improving  Accompanying Signs & Symptoms: pain in the mouth, did have some swelling of the gums that patient reports has improved   Previous history of similar problem: none   Precipitating factors:        Worsened by: movement, eating   Alleviating factors:        Improved by: cold water  Therapies tried and outcome: tylenol. And ibuprofen     Last week had swelling on right upper mouth then went away.   Gums very tender - was digging at mouth.   A day after the right side got better, left side swelled up  Roof of mouth hurts. Can't tell if there's a sore.   Sore under tongue.   No history of canker sore.   Cold sore on left lip - none in mouth.   No fever.   No rash on hands or feet.   No sickness  No recent illness for kids.   Tender along right face this morning. Felt puffy.   With dental infection, gums swell, sometimes tooth hurts but gums swell  No blood in stool  No vaginal discharge or sores         Objective    /71 (BP Location: Right arm, Patient Position: Sitting, Cuff Size: Adult Regular)   Pulse 95   Temp 99.4  F (37.4  C) (Tympanic)   Resp 16   Ht 1.611 m (5' 3.43\")   Wt 81 kg (178 lb 9 oz)   LMP 08/31/2023 (Approximate)   SpO2 98%   BMI 31.20 kg/m    Body mass index is 31.2 kg/m .      Physical Exam  Constitutional:       General: She is not in acute distress.     Appearance: Normal appearance. She is not ill-appearing.   HENT:      Head: Normocephalic and atraumatic.      Right Ear: Tympanic membrane and ear canal normal.      Left Ear: Tympanic membrane and ear canal normal.     "  Nose: Nose normal.      Mouth/Throat:      Mouth: Mucous membranes are moist.      Dentition: Dental tenderness present.      Palate: Lesions present. No mass.      Pharynx: Uvula midline. No pharyngeal swelling, posterior oropharyngeal erythema or uvula swelling.      Tonsils: No tonsillar exudate or tonsillar abscesses.        Comments: Tender along left upper gum line and with percussion of tooth. No abscess or severe dental swelling. Mild erythema of gum line.   Small ulcerated lesion on upper inner mandible and under tongue   Eyes:      Extraocular Movements: Extraocular movements intact.      Conjunctiva/sclera: Conjunctivae normal.   Pulmonary:      Effort: Pulmonary effort is normal.   Musculoskeletal:      Cervical back: Neck supple. No tenderness.   Lymphadenopathy:      Cervical: No cervical adenopathy.   Skin:     General: Skin is warm and dry.   Neurological:      Mental Status: She is alert.   Psychiatric:         Mood and Affect: Mood normal.         Behavior: Behavior normal.          No results found for any visits on 03/24/25.            Signed Electronically by: Nirali Stephens MD

## 2025-03-24 NOTE — TELEPHONE ENCOUNTER
Started a week ago, top of right gums, swelling and pain. This decreased, Now, bottom left swelled with new sore under tongue and roof of mouth feels raw.     She has had dental infections in the past, but not with the sores in her mouth.     She has tried to get to a dentist but has not found one that takes her insurance, yet.

## 2025-03-24 NOTE — PATIENT INSTRUCTIONS
Okay to use valtrex twice daily for five days.   Augmentin twice daily for 7 days   While on antibiotics, it's recommended you take probiotics, eat more yogurt or drink Kefir, to promote good bacteria in your gut. Doing this for 2 weeks after finishing the antibiotics is also recommended to minimize GI side effects, like diarrhea. For probiotics, ones that have >1,000,000 colony forming units (CFUs) are good to take.

## 2025-03-26 LAB
HSV1 DNA SPEC QL NAA+PROBE: DETECTED
HSV2 DNA SPEC QL NAA+PROBE: NOT DETECTED
SPECIMEN TYPE: ABNORMAL

## 2025-03-27 ENCOUNTER — TELEPHONE (OUTPATIENT)
Dept: FAMILY MEDICINE | Facility: OTHER | Age: 30
End: 2025-03-27

## 2025-03-27 DIAGNOSIS — B37.31 CANDIDIASIS OF VAGINA: Primary | ICD-10-CM

## 2025-03-27 RX ORDER — FLUCONAZOLE 150 MG/1
150 TABLET ORAL ONCE
Qty: 2 TABLET | Refills: 0 | Status: SHIPPED | OUTPATIENT
Start: 2025-03-27 | End: 2025-03-27

## 2025-03-27 NOTE — TELEPHONE ENCOUNTER
Pt called and updated regarding PCP message below.  Pt verbalized understanding.  No further questions or concerns at end of call.

## 2025-03-27 NOTE — TELEPHONE ENCOUNTER
Pt seen on 03/24/2025 and started on valacyclovir and amoxicillin-clavulanate.     Pt called and thinks she has a yeast infection.  Pt reporting vaginal swelling, burning sensation, and odorless clear to yellow discharge, vaginal itch, pt denies any fevers.    Pt has been taking the amoxicillin-clavulanate and will complete the course on Sunday.    Pt would like any new prescriptions to [facility] in [place].     Pt is unable to come in today due to other obligations but would be able to come in tomorrow.      Pt is willing to see a covering provider tomorrow if needed.

## 2025-03-27 NOTE — TELEPHONE ENCOUNTER
Symptom or reason needing to speak to RN: Red Flag Symptom.  Reaction to antibiotics. States after taking they now have burning with urination.     Best number to return call: [phone]      Best time to return call: anytime

## 2025-03-27 NOTE — TELEPHONE ENCOUNTER
This sounds like it could be a yeast infection.  I will send in Diflucan for this.  If she has kidney pain, blood in her urine, ongoing pain with urination or other urine symptoms, would need to be evaluated to assess her urine for possible UTI.

## 2025-04-14 ENCOUNTER — TELEPHONE (OUTPATIENT)
Dept: FAMILY MEDICINE | Facility: OTHER | Age: 30
End: 2025-04-14

## 2025-04-14 NOTE — TELEPHONE ENCOUNTER
8:02 AM    Reason for Call: Phone Call    Description: patient has infantigo and ointment that previously was prescribed is not working. Please call patient back to advise.    Was an appointment offered for this call? No  If yes : Appointment type              Date    Preferred method for responding to this message: Telephone Call  What is your phone number ? [phone]      If we cannot reach you directly, may we leave a detailed response at the number you provided? Yes    Can this message wait until your PCP/provider returns, if available today? Not applicable    Erlinda López

## 2025-04-14 NOTE — TELEPHONE ENCOUNTER
Patient used this starting again 10 days, under nose, spread almost to her lip and is itchy. Mupiricin since it flared but it is not effective.     Appointment or try something else?

## 2025-06-07 ENCOUNTER — HOSPITAL ENCOUNTER (EMERGENCY)
Facility: HOSPITAL | Age: 30
Discharge: HOME | End: 2025-06-07
Attending: NURSE PRACTITIONER | Admitting: NURSE PRACTITIONER
Payer: COMMERCIAL

## 2025-06-07 VITALS
OXYGEN SATURATION: 98 % | BODY MASS INDEX: 30.93 KG/M2 | DIASTOLIC BLOOD PRESSURE: 81 MMHG | SYSTOLIC BLOOD PRESSURE: 112 MMHG | TEMPERATURE: 98.7 F | WEIGHT: 177 LBS | HEART RATE: 80 BPM | RESPIRATION RATE: 16 BRPM

## 2025-06-07 DIAGNOSIS — R30.0 DYSURIA: ICD-10-CM

## 2025-06-07 DIAGNOSIS — N39.0 UTI (URINARY TRACT INFECTION), UNCOMPLICATED: ICD-10-CM

## 2025-06-07 LAB
ALBUMIN UR-MCNC: NEGATIVE MG/DL
APPEARANCE UR: CLEAR
BILIRUB UR QL STRIP: NEGATIVE
COLOR UR AUTO: ABNORMAL
GLUCOSE UR STRIP-MCNC: NEGATIVE MG/DL
HGB UR QL STRIP: NEGATIVE
KETONES UR STRIP-MCNC: NEGATIVE MG/DL
LEUKOCYTE ESTERASE UR QL STRIP: ABNORMAL
MUCOUS THREADS #/AREA URNS LPF: PRESENT /LPF
NITRATE UR QL: NEGATIVE
PH UR STRIP: 5.5 [PH] (ref 4.7–8)
RBC URINE: 1 /HPF
SP GR UR STRIP: 1.02 (ref 1–1.03)
SQUAMOUS EPITHELIAL: 1 /HPF
UROBILINOGEN UR STRIP-MCNC: NORMAL MG/DL
WBC URINE: 14 /HPF

## 2025-06-07 PROCEDURE — G0463 HOSPITAL OUTPT CLINIC VISIT: HCPCS | Performed by: NURSE PRACTITIONER

## 2025-06-07 PROCEDURE — 99213 OFFICE O/P EST LOW 20 MIN: CPT | Performed by: NURSE PRACTITIONER

## 2025-06-07 PROCEDURE — 81001 URINALYSIS AUTO W/SCOPE: CPT | Performed by: NURSE PRACTITIONER

## 2025-06-07 PROCEDURE — 87086 URINE CULTURE/COLONY COUNT: CPT | Performed by: NURSE PRACTITIONER

## 2025-06-07 RX ORDER — CEPHALEXIN 500 MG/1
500 CAPSULE ORAL 2 TIMES DAILY
Qty: 10 CAPSULE | Refills: 0 | Status: SHIPPED | OUTPATIENT
Start: 2025-06-07 | End: 2025-06-12

## 2025-06-07 ASSESSMENT — ENCOUNTER SYMPTOMS
ENDOCRINE NEGATIVE: 1
GASTROINTESTINAL NEGATIVE: 1
NEUROLOGICAL NEGATIVE: 1
DYSURIA: 1
HEMATOLOGIC/LYMPHATIC NEGATIVE: 1
CARDIOVASCULAR NEGATIVE: 1
EYES NEGATIVE: 1
MUSCULOSKELETAL NEGATIVE: 1
ALLERGIC/IMMUNOLOGIC NEGATIVE: 1
CONSTITUTIONAL NEGATIVE: 1
FLANK PAIN: 0
PSYCHIATRIC NEGATIVE: 1
RESPIRATORY NEGATIVE: 1
HEMATURIA: 0

## 2025-06-07 ASSESSMENT — ACTIVITIES OF DAILY LIVING (ADL): ADLS_ACUITY_SCORE: 41

## 2025-06-07 NOTE — DISCHARGE INSTRUCTIONS
Antibiotic use:   An antibiotic was ordered to help fight the bacterial infection that was acquired.  You need to take this medication exactly as prescribed.  DO NOT stop taking this medication until the prescribed end date, even if you are feeling better.  This way the affecting bacteria in your body are killed off.   You should eat probiotic yogurt (with live cultures) or Kefir (similar to yogurt) while taking antibiotic to promote regrowth of good bacteria in your digestive tract, which can be depleted by antibiotics.  Birth control and antibiotics (if applicable):   Birth control pills contain estrogens. Some antibiotics cause the enzymes in the liver to increase the break-down of estrogens and thereby can decrease the levels of estrogens in the body and the effectiveness of the birth control medications.  This can result in unwanted pregnancy.  To be safe it is wise to use a back-up-method of birth control while you take, and for 7 days after finishing the antibiotic.  Coumadin and antibiotics (if applicable):   Finally, if on coumadin, let your coumadin prescriber know. You likely need to have your INR checked by your Primary Care Provider in 2-3 days. Contact your clinic for an appointment.           Follow-up with your primary care provider for reevaluation.  Contact your primary care provider if you have any questions or concerns.  Do not hesitate to return to the ER if any new or worsening symptoms.     Please read the attached instructions (if any).  They highlight more specific treatments and interventions for you at home.              Thank you for letting me participate in your care and wish you a fast and uneventful recovery,    Franklin RANKIN CNP    Do not hesitate to contact me with questions or concerns.  [email or web address]

## 2025-06-07 NOTE — ED TRIAGE NOTES
Pt presents with frequent and painful urination x3-4 days. Denied discharge, abd pain, back or flank pain. PT has not taken any otc medications.

## 2025-06-07 NOTE — ED PROVIDER NOTES
History     Chief Complaint   Patient presents with    Frequency     HPI  Tasneem Harrington is a 29 year old individual with history of depression, IBS, comes in for dysuria.  Patient states that she uses stent and still on her body and she usually gets irritation from this.  Now has been having burning with urination.  Comes in for evaluation.  No fever or chills.  No hematuria reported.  No vaginal bleeding or discharge.  Has history of hysterectomy.  Denies pregnancy.  No bowel issues.  No abdominal pain or flank pain reported.    Allergies:  Allergies   Allergen Reactions    Codeine Hives, Itching and Nausea    Latex Itching, Rash and Hives    Morphine Hives and Nausea and Vomiting    Sulfamethoxazole-Trimethoprim Hives, Nausea and Vomiting and Rash       Problem List:    Patient Active Problem List    Diagnosis Date Noted    S/P hysterectomy 05/16/2024     Priority: Medium    Depression 06/16/2023     Priority: Medium     History of lexapro       TMJ (temporomandibular joint syndrome) 05/12/2023     Priority: Medium    Irritable bowel syndrome with constipation 03/01/2022     Priority: Medium    Intractable migraine with aura without status migrainosus 07/09/2019     Priority: Medium    Oral herpes 10/25/2018     Priority: Medium     HSV 1. Oral HSV          Past Medical History:    Past Medical History:   Diagnosis Date    Endometriosis     Endometriosis     Menorrhagia with regular cycle 05/05/2023    Migraine     Postpartum depression 02/11/2019    Tinea versicolor        Past Surgical History:    Past Surgical History:   Procedure Laterality Date    LAPAROSCOPIC ASSISTED HYSTERECTOMY VAGINAL N/A 9/20/2023    Procedure: HYSTERECTOMY, VAGINAL, LAPAROSCOPY-ASSISTED, Lysis of adhesions, fulguration of endometriosis;  Surgeon: Yevgeniy Brenner MD;  Location: [facility]    LAPAROSCOPY      TONSILLECTOMY      TUBAL LIGATION  2019       Family History:    Family History   Problem Relation Age of Onset    Hypertension  Father     Hyperlipidemia Father     Crohn's Disease Brother     Heart Disease Maternal Grandmother         leaky valve    Aneurysm Maternal Grandmother     Crohn's Disease Cousin     Cancer Paternal Uncle         throat cancer    Hyperlipidemia Son        Social History:  Marital Status:   [2]  Social History     Tobacco Use    Smoking status: Never     Passive exposure: Current    Smokeless tobacco: Never   Vaping Use    Vaping status: Never Used   Substance Use Topics    Alcohol use: Not Currently     Comment: rare    Drug use: Never        Medications:    cephALEXin (KEFLEX) 500 MG capsule  ibuprofen (ADVIL/MOTRIN) 800 MG tablet  valACYclovir (VALTREX) 1000 mg tablet  vitamin D3 (CHOLECALCIFEROL) 250 mcg (98328 units) capsule          Review of Systems   Constitutional: Negative.    HENT: Negative.     Eyes: Negative.    Respiratory: Negative.     Cardiovascular: Negative.    Gastrointestinal: Negative.    Endocrine: Negative.    Genitourinary:  Positive for dysuria. Negative for flank pain, hematuria, pelvic pain, urgency, vaginal bleeding and vaginal discharge.   Musculoskeletal: Negative.    Skin: Negative.    Allergic/Immunologic: Negative.    Neurological: Negative.    Hematological: Negative.    Psychiatric/Behavioral: Negative.         Physical Exam   BP: 112/81  Pulse: 80  Temp: 98.7  F (37.1  C)  Resp: 16  Weight: 80.3 kg (177 lb)  SpO2: 98 %      GENERAL APPEARANCE:  The patient is a 29 year old well-developed, well-nourished individual that appears as stated age.  ABDOMEN:  Soft.  No mass, tenderness, guarding, or rebound.  No organomegaly or hernia.  Bowel sounds are present.  No CVA tenderness or flank mass.  No abdominal bruits or thrills present upon auscultation/palpation.  GENITOURINARY: No obvious anterior pelvic tenderness, hernia, mass noted to palpation.   PSYCHIATRIC:  The patient is awake, alert, and oriented x4.  Recent and remote memory is intact.  Appropriate mood and affect.   Calm and cooperative with history and physical exam.  SKIN:  Warm, dry, and well perfused.  Good turgor.  No lesions, nodules, or rashes are noted.  No bruising noted.       ED Course     ED Course as of 06/07/25 1422   Sat Jun 07, 2025   1357 In to see patient and history/physical completed.    1417 UA with Microscopic reflex to Culture(!)  UA shows moderate leukocyte esterase with 14 WBCs.  Urine culture pending.  Will discharge home on cephalexin 500 mg twice daily x 5 days for treatment.  Follow-up recommendations return precautions given.                 Results for orders placed or performed during the hospital encounter of 06/07/25 (from the past 24 hours)   UA with Microscopic reflex to Culture    Specimen: Urine, Clean Catch   Result Value Ref Range    Color Urine Light Yellow Colorless, Straw, Light Yellow, Yellow    Appearance Urine Clear Clear    Glucose Urine Negative Negative mg/dL    Bilirubin Urine Negative Negative    Ketones Urine Negative Negative mg/dL    Specific Gravity Urine 1.018 1.003 - 1.035    Blood Urine Negative Negative    pH Urine 5.5 4.7 - 8.0    Protein Albumin Urine Negative Negative mg/dL    Urobilinogen Urine Normal Normal mg/dL    Nitrite Urine Negative Negative    Leukocyte Esterase Urine Moderate (A) Negative    Mucus Urine Present (A) None Seen /LPF    RBC Urine 1 <=2 /HPF    WBC Urine 14 (H) <=5 /HPF    Squamous Epithelials Urine 1 <=1 /HPF    Narrative    Urine Culture ordered based on laboratory criteria       Medications - No data to display    Assessments & Plan (with Medical Decision Making)     I have reviewed the nursing notes.    I have reviewed the findings, diagnosis, plan and need for follow up with the patient.    Summary:  Patient presents to the ER today dysuria.  Potential diagnosis which have been considered and evaluated include urethritis, UTI, vaginitis, ureteral stone, as well as others. Many of these have been excluded using the various modalities and  assessment as noted on the chart. At the present time, the diagnosis is UTI with dysuria.  Upon arrival, vitals signs are normal.  The patient is alert and oriented.  No abdominal tenderness or flank tenderness upon palpation.  No anterior pelvic tenderness noted to palpation.  No hernia or mass.  UA obtained showing moderate leukocyte esterase with 14 WBC's and only 1 squamous epithelial cell.  Urine culture pending.  Patient with slightly positive UA.  Will discharge home on cephalexin 5 or milligrams twice daily x 5 days.  Patient deferred Pyridium.  Follow-up with PCP and return to urgent care if new or worsening symptoms.  Patient verbalized understand agrees with plan of care.  Patient discharged home.        Critical Care Time: None    Impression and plan discussed with patient. Questions answered, concerns addressed, indications for urgent re-evaluation reviewed, and  given. Patient/Parent/Caregiver agree with treatment plan and have no further questions at this time.  AVS provided at discharge.    This document was prepared using a combination of typing and voice generated software.  While every attempt was made for accuracy, spelling and grammatical errors may exist.              New Prescriptions    CEPHALEXIN (KEFLEX) 500 MG CAPSULE    Take 1 capsule (500 mg) by mouth 2 times daily for 5 days.       Final diagnoses:   UTI (urinary tract infection), uncomplicated   Dysuria       6/7/2025   [facility]       Franklin Garcia APRN CNP  06/07/25 [id number]

## 2025-06-09 ENCOUNTER — TELEPHONE (OUTPATIENT)
Dept: FAMILY MEDICINE | Facility: OTHER | Age: 30
End: 2025-06-09

## 2025-06-09 LAB — BACTERIA UR CULT: NORMAL

## 2025-06-09 NOTE — TELEPHONE ENCOUNTER
Symptom or reason needing to speak to RN: Can't pee without being in horrible pain      Best number to return call: [phone]     Best time to return call: Anytime

## 2025-06-10 ENCOUNTER — OFFICE VISIT (OUTPATIENT)
Dept: FAMILY MEDICINE | Facility: OTHER | Age: 30
End: 2025-06-10
Attending: NURSE PRACTITIONER
Payer: COMMERCIAL

## 2025-06-10 ENCOUNTER — RESULTS FOLLOW-UP (OUTPATIENT)
Dept: FAMILY MEDICINE | Facility: OTHER | Age: 30
End: 2025-06-10

## 2025-06-10 VITALS
BODY MASS INDEX: 31.54 KG/M2 | WEIGHT: 178 LBS | OXYGEN SATURATION: 98 % | RESPIRATION RATE: 20 BRPM | HEART RATE: 101 BPM | TEMPERATURE: 98.7 F | SYSTOLIC BLOOD PRESSURE: 110 MMHG | HEIGHT: 63 IN | DIASTOLIC BLOOD PRESSURE: 74 MMHG

## 2025-06-10 DIAGNOSIS — A60.00 GENITAL HERPES SIMPLEX, UNSPECIFIED SITE: ICD-10-CM

## 2025-06-10 DIAGNOSIS — N94.9 VAGINAL DISCOMFORT: Primary | ICD-10-CM

## 2025-06-10 LAB
BACTERIAL VAGINOSIS VAG-IMP: NEGATIVE
CANDIDA DNA VAG QL NAA+PROBE: NOT DETECTED
CANDIDA GLABRATA / CANDIDA KRUSEI DNA: NOT DETECTED
HOLD SPECIMEN: NORMAL
HOLD SPECIMEN: NORMAL
T VAGINALIS DNA VAG QL NAA+PROBE: NOT DETECTED

## 2025-06-10 PROCEDURE — 86696 HERPES SIMPLEX TYPE 2 TEST: CPT | Mod: ZL | Performed by: NURSE PRACTITIONER

## 2025-06-10 PROCEDURE — 36415 COLL VENOUS BLD VENIPUNCTURE: CPT | Mod: ZL | Performed by: NURSE PRACTITIONER

## 2025-06-10 PROCEDURE — 87529 HSV DNA AMP PROBE: CPT | Mod: ZL | Performed by: NURSE PRACTITIONER

## 2025-06-10 PROCEDURE — 81515 NFCT DS BV&VAGINITIS DNA ALG: CPT | Mod: ZL | Performed by: NURSE PRACTITIONER

## 2025-06-10 PROCEDURE — 36415 COLL VENOUS BLD VENIPUNCTURE: CPT | Performed by: NURSE PRACTITIONER

## 2025-06-10 PROCEDURE — G0463 HOSPITAL OUTPT CLINIC VISIT: HCPCS

## 2025-06-10 RX ORDER — VALACYCLOVIR HYDROCHLORIDE 1 G/1
1000 TABLET, FILM COATED ORAL 2 TIMES DAILY
Qty: 20 TABLET | Refills: 0 | Status: SHIPPED | OUTPATIENT
Start: 2025-06-10 | End: 2025-06-20

## 2025-06-10 ASSESSMENT — PAIN SCALES - GENERAL: PAINLEVEL_OUTOF10: NO PAIN (0)

## 2025-06-10 NOTE — PROGRESS NOTES
"  Assessment & Plan     (N94.9) Vaginal discomfort  (A60.00) Genital herpes simplex, unspecified site  Comment: patient with shallow very tender ulcer left labia - appears like herpes  Plan: valACYclovir (VALTREX) 1000 mg tablet        Area was swabbed for herpes. Will also order blood work. Will notify patient of the results when available and intervene accordingly.     Vaginal multiplex also collected. Will notify patient of the results when available and intervene accordingly.     Will also start Valtrex as we wait for labs to return.     She will avoid intercourse until sore resolves. Condom use encouraged.       BMI  Estimated body mass index is 31.11 kg/m  as calculated from the following:    Height as of this encounter: 1.611 m (5' 3.43\").    Weight as of this encounter: 80.7 kg (178 lb).   Weight management plan: Discussed healthy diet and exercise guidelines        Diogenes Boateng is a 29 year old, presenting for the following health issues:  urine pain    History of Present Illness       Reason for visit:  Burns stings when i pee  Symptom onset:  3-7 days ago   She is taking medications regularly.        Concern - Pees when she stings  Onset: used scented soap last week, now has burning when she voids  Description: burns while she pees   Intensity: moderate   Progression of Symptoms:  same  Accompanying Signs & Symptoms: no fevers, no vaginal discharge  Previous history of similar problem: none  Precipitating factors:        Worsened by: voiding  Alleviating factors:        Improved by: none  Therapies tried and outcome: currently on antibotics for a uti went to urgent care over the weekend.     Was seen in the ER on 6/7/25 for dysuria. Keflex was ordered. Urine culture did return and was negative.     No h/o herpes.       Review of Systems  Constitutional, HEENT, cardiovascular, pulmonary, gi and gu systems are negative, except as otherwise noted.      Objective    /74 (BP Location: Left arm, " "Patient Position: Sitting, Cuff Size: Adult Regular)   Pulse 101   Temp 98.7  F (37.1  C) (Tympanic)   Resp 20   Ht 1.611 m (5' 3.43\")   Wt 80.7 kg (178 lb)   LMP 08/31/2023 (Approximate)   SpO2 98%   BMI 31.11 kg/m    Body mass index is 31.11 kg/m .  Physical Exam   GENERAL: alert and no distress  EYES: Eyes grossly normal to inspection, PERRL and conjunctivae and sclerae normal  HENT: ear canals and TM's normal, nose and mouth without ulcers or lesions  NECK: no adenopathy, no asymmetry, masses, or scars  RESP: lungs clear to auscultation - no rales, rhonchi or wheezes  CV: regular rate and rhythm, normal S1 S2, no S3 or S4, no murmur, click or rub, no peripheral edema  ABDOMEN: soft, nontender, no hepatosplenomegaly, no masses and bowel sounds normal   (female): patient has shallow ulcer left inner labia - very tender, no vaginal discharge, normal urethral meatus - very difficult collecting specimen as she was having a lot of pain, unable to insert speculum due to pain  MS: no gross musculoskeletal defects noted, no edema  NEURO: Normal strength and tone, mentation intact and speech normal  PSYCH: mentation appears normal, affect normal/bright    Labs in process.         Signed Electronically by: Lynnette Perez NP    "

## 2025-06-11 LAB
HSV1 DNA SPEC QL NAA+PROBE: NEGATIVE
HSV1 DNA SPEC QL NAA+PROBE: NOT DETECTED
HSV1 IGG SERPL QL IA: 18.2 INDEX
HSV1 IGG SERPL QL IA: ABNORMAL
HSV2 DNA SPEC QL NAA+PROBE: NEGATIVE
HSV2 DNA SPEC QL NAA+PROBE: NOT DETECTED
HSV2 IGG SERPL QL IA: 0.05 INDEX
HSV2 IGG SERPL QL IA: ABNORMAL
SPECIMEN TYPE: NORMAL
SPECIMEN TYPE: NORMAL

## 2025-07-25 ENCOUNTER — MYC MEDICAL ADVICE (OUTPATIENT)
Dept: FAMILY MEDICINE | Facility: OTHER | Age: 30
End: 2025-07-25

## 2025-07-25 DIAGNOSIS — A60.00 GENITAL HERPES SIMPLEX, UNSPECIFIED SITE: Primary | ICD-10-CM

## 2025-07-25 DIAGNOSIS — B00.2 ORAL HERPES: ICD-10-CM

## 2025-07-28 RX ORDER — VALACYCLOVIR HYDROCHLORIDE 500 MG/1
500 TABLET, FILM COATED ORAL DAILY
Qty: 90 TABLET | Refills: 1 | Status: SHIPPED | OUTPATIENT
Start: 2025-07-28

## (undated) DEVICE — UTERINE MANIPULATOR HUMI KRONER 6003

## (undated) DEVICE — SUCTION STRYKERFLOW II 250-070-500

## (undated) DEVICE — ESU GROUND PAD ADULT W/CORD E7507

## (undated) DEVICE — CATH TRAY 16FR METER W/STATLOCK LATEX] A902916

## (undated) DEVICE — BLADE CLIPPER SGL USE 9680

## (undated) DEVICE — ESU CORD MONOPOLAR 10'  E0510

## (undated) DEVICE — TUBING INSUFFLATION INSUFFLATOR FILTER HIGH FLOW 031322-01

## (undated) DEVICE — SOL NACL 0.9% INJ 1000ML BAG 2B1324X

## (undated) DEVICE — SU DERMABOND MINI DHVM12

## (undated) DEVICE — DRAPE STERI TOWEL LG 1010

## (undated) DEVICE — BIN-UROLOGY / CYSTO BN47

## (undated) DEVICE — ESU LIGASURE MARYLAND LAPAROSCOPIC SLR/DVDR 5MMX37CM LF1937

## (undated) DEVICE — Device

## (undated) DEVICE — SOL WATER IRRIG 1000ML BOTTLE 2F7114

## (undated) DEVICE — SPONGE LAP 18X18" X8435

## (undated) DEVICE — GLOVE PROTEXIS POWDER FREE 7.0 ORTHOPEDIC 2D73ET70

## (undated) DEVICE — BLANKET BAIR HUGGER UPPER BODY 42268

## (undated) DEVICE — SU VICRYL 0 CT-2 CR 8X18" J727D

## (undated) DEVICE — TUBING IRR TUR Y TYPE 2C4041

## (undated) DEVICE — ENDO TROCAR SHIELDED BLADED KII ADV FIX 05X100MM CFB03

## (undated) DEVICE — ENDO TROCAR SLEEVE KII ADV FIXATION 05X100MM CFS02

## (undated) DEVICE — SYSTEM CLEARIFY VISUALIZATION 21-345

## (undated) DEVICE — PREP CHLORAPREP 26ML TINTED HI-LITE ORANGE 930815

## (undated) DEVICE — SLEEVE SCD EXPRESS KNEE LENGTH MED 9529

## (undated) DEVICE — CATH SELF CATH MALE 14FR 414

## (undated) DEVICE — PRESSURE INFUSOR DISP. 3000CC 233000

## (undated) DEVICE — ELECTRODE ELECTROSURGICAL SPATULA 33CMX5MM 5600180

## (undated) DEVICE — NDL INSUFFLATION 13GA 120MM C2201

## (undated) DEVICE — CATH-FOLEY-2 WAY 16FR-5CC

## (undated) DEVICE — SU VICRYL 2-0 CT-1 36" UND J945H

## (undated) DEVICE — SOL NACL 0.9% IRRIG 1000ML BOTTLE 2F7124

## (undated) DEVICE — DRAPE SHEET REV FOLD 3/4 9349

## (undated) DEVICE — LABEL STERILE PREPRINTED FOR OR FRRH01-2M

## (undated) DEVICE — SU VICRYL 0 CT-1 36" J346H

## (undated) DEVICE — CANISTER SUCTION MEDI-VAC GUARDIAN 2000ML 90D 65651-220

## (undated) DEVICE — PACK BASIN SET UP SUTCNBSBBA

## (undated) DEVICE — SOL NACL 0.9% IRRIG 3000ML BAG 2B7477

## (undated) DEVICE — ESU PENCIL W/SMOKE EVAC CVPLP2000

## (undated) DEVICE — EVAC SYSTEM CLEAR FLOW SC082500

## (undated) DEVICE — PACK LAP LAVH CUSTOM SMA32LPMBG

## (undated) DEVICE — SU VICRYL 3-0 CT-2 27" UND J232H

## (undated) DEVICE — TRAY PREP DRY SKIN SCRUB 067

## (undated) DEVICE — COVER LT HANDLE 2/PK 5160-2FG

## (undated) RX ORDER — PROPOFOL 10 MG/ML
INJECTION, EMULSION INTRAVENOUS
Status: DISPENSED
Start: 2023-09-20

## (undated) RX ORDER — FENTANYL CITRATE 50 UG/ML
INJECTION, SOLUTION INTRAMUSCULAR; INTRAVENOUS
Status: DISPENSED
Start: 2023-09-20

## (undated) RX ORDER — DEXAMETHASONE SODIUM PHOSPHATE 10 MG/ML
INJECTION, SOLUTION INTRAMUSCULAR; INTRAVENOUS
Status: DISPENSED
Start: 2023-09-20

## (undated) RX ORDER — ONDANSETRON 2 MG/ML
INJECTION INTRAMUSCULAR; INTRAVENOUS
Status: DISPENSED
Start: 2023-09-20